# Patient Record
Sex: MALE | Race: WHITE | Employment: FULL TIME | URBAN - METROPOLITAN AREA
[De-identification: names, ages, dates, MRNs, and addresses within clinical notes are randomized per-mention and may not be internally consistent; named-entity substitution may affect disease eponyms.]

---

## 2024-08-14 ENCOUNTER — APPOINTMENT (OUTPATIENT)
Dept: CARDIOLOGY | Facility: CLINIC | Age: 69
End: 2024-08-14
Payer: COMMERCIAL

## 2024-10-14 ENCOUNTER — HOSPITAL ENCOUNTER (INPATIENT)
Facility: HOSPITAL | Age: 69
LOS: 4 days | Discharge: HOME | End: 2024-10-18
Payer: MEDICARE

## 2024-10-14 ENCOUNTER — APPOINTMENT (OUTPATIENT)
Dept: RADIOLOGY | Facility: HOSPITAL | Age: 69
End: 2024-10-14

## 2024-10-14 ENCOUNTER — APPOINTMENT (OUTPATIENT)
Dept: CARDIOLOGY | Facility: HOSPITAL | Age: 69
End: 2024-10-14

## 2024-10-14 DIAGNOSIS — I31.39 PERICARDIAL EFFUSION (HHS-HCC): Primary | ICD-10-CM

## 2024-10-14 LAB
ABO GROUP (TYPE) IN BLOOD: NORMAL
ALBUMIN SERPL BCP-MCNC: 3.6 G/DL (ref 3.4–5)
ALP SERPL-CCNC: 108 U/L (ref 33–136)
ALT SERPL W P-5'-P-CCNC: 21 U/L (ref 10–52)
ANION GAP SERPL CALC-SCNC: 18 MMOL/L (ref 10–20)
ANTIBODY SCREEN: NORMAL
APTT PPP: 27 SECONDS (ref 27–38)
AST SERPL W P-5'-P-CCNC: 18 U/L (ref 9–39)
BASOPHILS # BLD AUTO: 0.02 X10*3/UL (ref 0–0.1)
BASOPHILS NFR BLD AUTO: 0.3 %
BILIRUB SERPL-MCNC: 1 MG/DL (ref 0–1.2)
BNP SERPL-MCNC: 108 PG/ML (ref 0–99)
BUN SERPL-MCNC: 23 MG/DL (ref 6–23)
CALCIUM SERPL-MCNC: 8.6 MG/DL (ref 8.6–10.6)
CARDIAC TROPONIN I PNL SERPL HS: 25 NG/L (ref 0–53)
CHLORIDE SERPL-SCNC: 99 MMOL/L (ref 98–107)
CO2 SERPL-SCNC: 27 MMOL/L (ref 21–32)
CREAT SERPL-MCNC: 1 MG/DL (ref 0.5–1.3)
CRP SERPL-MCNC: 17.08 MG/DL
EGFRCR SERPLBLD CKD-EPI 2021: 82 ML/MIN/1.73M*2
EOSINOPHIL # BLD AUTO: 0.19 X10*3/UL (ref 0–0.7)
EOSINOPHIL NFR BLD AUTO: 2.5 %
ERYTHROCYTE [DISTWIDTH] IN BLOOD BY AUTOMATED COUNT: 17.4 % (ref 11.5–14.5)
GLUCOSE BLD MANUAL STRIP-MCNC: 141 MG/DL (ref 74–99)
GLUCOSE BLD MANUAL STRIP-MCNC: 229 MG/DL (ref 74–99)
GLUCOSE SERPL-MCNC: 119 MG/DL (ref 74–99)
HCT VFR BLD AUTO: 45.8 % (ref 41–52)
HGB BLD-MCNC: 14.2 G/DL (ref 13.5–17.5)
IMM GRANULOCYTES # BLD AUTO: 0.02 X10*3/UL (ref 0–0.7)
IMM GRANULOCYTES NFR BLD AUTO: 0.3 % (ref 0–0.9)
INR PPP: 1.2 (ref 0.9–1.1)
LACTATE SERPL-SCNC: 2.3 MMOL/L (ref 0.4–2)
LACTATE SERPL-SCNC: 3.9 MMOL/L (ref 0.4–2)
LYMPHOCYTES # BLD AUTO: 1.53 X10*3/UL (ref 1.2–4.8)
LYMPHOCYTES NFR BLD AUTO: 20.2 %
MAGNESIUM SERPL-MCNC: 2.13 MG/DL (ref 1.6–2.4)
MCH RBC QN AUTO: 26.9 PG (ref 26–34)
MCHC RBC AUTO-ENTMCNC: 31 G/DL (ref 32–36)
MCV RBC AUTO: 87 FL (ref 80–100)
MONOCYTES # BLD AUTO: 0.59 X10*3/UL (ref 0.1–1)
MONOCYTES NFR BLD AUTO: 7.8 %
NEUTROPHILS # BLD AUTO: 5.22 X10*3/UL (ref 1.2–7.7)
NEUTROPHILS NFR BLD AUTO: 68.9 %
NRBC BLD-RTO: 0 /100 WBCS (ref 0–0)
PHOSPHATE SERPL-MCNC: 4.4 MG/DL (ref 2.5–4.9)
PLATELET # BLD AUTO: 133 X10*3/UL (ref 150–450)
POTASSIUM SERPL-SCNC: 3.5 MMOL/L (ref 3.5–5.3)
PROT SERPL-MCNC: 6.5 G/DL (ref 6.4–8.2)
PROTHROMBIN TIME: 13.6 SECONDS (ref 9.8–12.8)
RBC # BLD AUTO: 5.28 X10*6/UL (ref 4.5–5.9)
RH FACTOR (ANTIGEN D): NORMAL
SODIUM SERPL-SCNC: 140 MMOL/L (ref 136–145)
WBC # BLD AUTO: 7.6 X10*3/UL (ref 4.4–11.3)

## 2024-10-14 PROCEDURE — 83735 ASSAY OF MAGNESIUM: CPT

## 2024-10-14 PROCEDURE — 2500000001 HC RX 250 WO HCPCS SELF ADMINISTERED DRUGS (ALT 637 FOR MEDICARE OP)

## 2024-10-14 PROCEDURE — 1200000002 HC GENERAL ROOM WITH TELEMETRY DAILY

## 2024-10-14 PROCEDURE — 82947 ASSAY GLUCOSE BLOOD QUANT: CPT

## 2024-10-14 PROCEDURE — 71045 X-RAY EXAM CHEST 1 VIEW: CPT

## 2024-10-14 PROCEDURE — 2500000002 HC RX 250 W HCPCS SELF ADMINISTERED DRUGS (ALT 637 FOR MEDICARE OP, ALT 636 FOR OP/ED)

## 2024-10-14 PROCEDURE — 36415 COLL VENOUS BLD VENIPUNCTURE: CPT

## 2024-10-14 PROCEDURE — 85610 PROTHROMBIN TIME: CPT

## 2024-10-14 PROCEDURE — 86140 C-REACTIVE PROTEIN: CPT

## 2024-10-14 PROCEDURE — 83605 ASSAY OF LACTIC ACID: CPT

## 2024-10-14 PROCEDURE — 93005 ELECTROCARDIOGRAM TRACING: CPT

## 2024-10-14 PROCEDURE — 80053 COMPREHEN METABOLIC PANEL: CPT

## 2024-10-14 PROCEDURE — 93010 ELECTROCARDIOGRAM REPORT: CPT | Performed by: INTERNAL MEDICINE

## 2024-10-14 PROCEDURE — 86901 BLOOD TYPING SEROLOGIC RH(D): CPT

## 2024-10-14 PROCEDURE — 84484 ASSAY OF TROPONIN QUANT: CPT

## 2024-10-14 PROCEDURE — 84100 ASSAY OF PHOSPHORUS: CPT

## 2024-10-14 PROCEDURE — 85025 COMPLETE CBC W/AUTO DIFF WBC: CPT

## 2024-10-14 PROCEDURE — 99222 1ST HOSP IP/OBS MODERATE 55: CPT

## 2024-10-14 PROCEDURE — 83880 ASSAY OF NATRIURETIC PEPTIDE: CPT

## 2024-10-14 PROCEDURE — 71045 X-RAY EXAM CHEST 1 VIEW: CPT | Performed by: STUDENT IN AN ORGANIZED HEALTH CARE EDUCATION/TRAINING PROGRAM

## 2024-10-14 RX ORDER — ROSUVASTATIN CALCIUM 40 MG/1
40 TABLET, COATED ORAL NIGHTLY
Status: DISCONTINUED | OUTPATIENT
Start: 2024-10-14 | End: 2024-10-18 | Stop reason: HOSPADM

## 2024-10-14 RX ORDER — COLCHICINE 0.6 MG/1
0.6 TABLET ORAL DAILY
Status: DISCONTINUED | OUTPATIENT
Start: 2024-10-15 | End: 2024-10-15

## 2024-10-14 RX ORDER — ACETAMINOPHEN 325 MG/1
650 TABLET ORAL EVERY 6 HOURS PRN
Status: DISCONTINUED | OUTPATIENT
Start: 2024-10-14 | End: 2024-10-18 | Stop reason: HOSPADM

## 2024-10-14 RX ORDER — PRAMIPEXOLE DIHYDROCHLORIDE 0.5 MG/1
0.5 TABLET ORAL EVERY MORNING
Status: DISCONTINUED | OUTPATIENT
Start: 2024-10-15 | End: 2024-10-14

## 2024-10-14 RX ORDER — POTASSIUM CHLORIDE 20 MEQ/1
40 TABLET, EXTENDED RELEASE ORAL ONCE
Status: COMPLETED | OUTPATIENT
Start: 2024-10-14 | End: 2024-10-14

## 2024-10-14 RX ORDER — EZETIMIBE 10 MG/1
10 TABLET ORAL NIGHTLY
Status: DISCONTINUED | OUTPATIENT
Start: 2024-10-14 | End: 2024-10-18 | Stop reason: HOSPADM

## 2024-10-14 RX ORDER — PANTOPRAZOLE SODIUM 20 MG/1
20 TABLET, DELAYED RELEASE ORAL
Status: DISCONTINUED | OUTPATIENT
Start: 2024-10-15 | End: 2024-10-18 | Stop reason: HOSPADM

## 2024-10-14 RX ORDER — PREGABALIN 75 MG/1
150 CAPSULE ORAL 2 TIMES DAILY
Status: DISCONTINUED | OUTPATIENT
Start: 2024-10-14 | End: 2024-10-18 | Stop reason: HOSPADM

## 2024-10-14 RX ORDER — INSULIN LISPRO 100 [IU]/ML
0-5 INJECTION, SOLUTION INTRAVENOUS; SUBCUTANEOUS
Status: DISCONTINUED | OUTPATIENT
Start: 2024-10-15 | End: 2024-10-18 | Stop reason: HOSPADM

## 2024-10-14 RX ORDER — NORTRIPTYLINE HYDROCHLORIDE 25 MG/1
25 CAPSULE ORAL NIGHTLY
Status: DISCONTINUED | OUTPATIENT
Start: 2024-10-14 | End: 2024-10-18 | Stop reason: HOSPADM

## 2024-10-14 RX ORDER — POLYETHYLENE GLYCOL 3350 17 G/17G
17 POWDER, FOR SOLUTION ORAL DAILY
Status: DISCONTINUED | OUTPATIENT
Start: 2024-10-14 | End: 2024-10-18 | Stop reason: HOSPADM

## 2024-10-14 RX ORDER — PRAMIPEXOLE DIHYDROCHLORIDE 0.5 MG/1
0.5 TABLET ORAL NIGHTLY
Status: DISCONTINUED | OUTPATIENT
Start: 2024-10-14 | End: 2024-10-18 | Stop reason: HOSPADM

## 2024-10-14 RX ORDER — ASPIRIN 325 MG
650 TABLET, DELAYED RELEASE (ENTERIC COATED) ORAL EVERY 8 HOURS
Status: DISCONTINUED | OUTPATIENT
Start: 2024-10-14 | End: 2024-10-18 | Stop reason: HOSPADM

## 2024-10-14 RX ORDER — DULOXETIN HYDROCHLORIDE 30 MG/1
30 CAPSULE, DELAYED RELEASE ORAL DAILY
Status: DISCONTINUED | OUTPATIENT
Start: 2024-10-14 | End: 2024-10-18 | Stop reason: HOSPADM

## 2024-10-14 SDOH — SOCIAL STABILITY: SOCIAL INSECURITY: HAS ANYONE EVER THREATENED TO HURT YOUR FAMILY OR YOUR PETS?: NO

## 2024-10-14 SDOH — SOCIAL STABILITY: SOCIAL INSECURITY: HAVE YOU HAD ANY THOUGHTS OF HARMING ANYONE ELSE?: NO

## 2024-10-14 SDOH — SOCIAL STABILITY: SOCIAL INSECURITY: ABUSE: ADULT

## 2024-10-14 SDOH — SOCIAL STABILITY: SOCIAL INSECURITY: ARE YOU OR HAVE YOU BEEN THREATENED OR ABUSED PHYSICALLY, EMOTIONALLY, OR SEXUALLY BY ANYONE?: NO

## 2024-10-14 SDOH — SOCIAL STABILITY: SOCIAL INSECURITY: DO YOU FEEL ANYONE HAS EXPLOITED OR TAKEN ADVANTAGE OF YOU FINANCIALLY OR OF YOUR PERSONAL PROPERTY?: NO

## 2024-10-14 SDOH — SOCIAL STABILITY: SOCIAL INSECURITY: DO YOU FEEL UNSAFE GOING BACK TO THE PLACE WHERE YOU ARE LIVING?: NO

## 2024-10-14 SDOH — SOCIAL STABILITY: SOCIAL INSECURITY: DOES ANYONE TRY TO KEEP YOU FROM HAVING/CONTACTING OTHER FRIENDS OR DOING THINGS OUTSIDE YOUR HOME?: NO

## 2024-10-14 SDOH — SOCIAL STABILITY: SOCIAL INSECURITY: HAVE YOU HAD THOUGHTS OF HARMING ANYONE ELSE?: NO

## 2024-10-14 SDOH — SOCIAL STABILITY: SOCIAL INSECURITY: ARE THERE ANY APPARENT SIGNS OF INJURIES/BEHAVIORS THAT COULD BE RELATED TO ABUSE/NEGLECT?: NO

## 2024-10-14 SDOH — SOCIAL STABILITY: SOCIAL INSECURITY: WERE YOU ABLE TO COMPLETE ALL THE BEHAVIORAL HEALTH SCREENINGS?: YES

## 2024-10-14 ASSESSMENT — COLUMBIA-SUICIDE SEVERITY RATING SCALE - C-SSRS
2. HAVE YOU ACTUALLY HAD ANY THOUGHTS OF KILLING YOURSELF?: NO
6. HAVE YOU EVER DONE ANYTHING, STARTED TO DO ANYTHING, OR PREPARED TO DO ANYTHING TO END YOUR LIFE?: NO
1. IN THE PAST MONTH, HAVE YOU WISHED YOU WERE DEAD OR WISHED YOU COULD GO TO SLEEP AND NOT WAKE UP?: NO

## 2024-10-14 ASSESSMENT — ACTIVITIES OF DAILY LIVING (ADL)
HEARING - RIGHT EAR: FUNCTIONAL
FEEDING YOURSELF: INDEPENDENT
TOILETING: INDEPENDENT
WALKS IN HOME: INDEPENDENT
GROOMING: INDEPENDENT
ADEQUATE_TO_COMPLETE_ADL: YES
HEARING - LEFT EAR: FUNCTIONAL
JUDGMENT_ADEQUATE_SAFELY_COMPLETE_DAILY_ACTIVITIES: YES
BATHING: INDEPENDENT
PATIENT'S MEMORY ADEQUATE TO SAFELY COMPLETE DAILY ACTIVITIES?: YES
DRESSING YOURSELF: INDEPENDENT

## 2024-10-14 ASSESSMENT — COGNITIVE AND FUNCTIONAL STATUS - GENERAL
MOVING TO AND FROM BED TO CHAIR: A LITTLE
HELP NEEDED FOR BATHING: A LITTLE
WALKING IN HOSPITAL ROOM: A LITTLE
DAILY ACTIVITIY SCORE: 22
CLIMB 3 TO 5 STEPS WITH RAILING: A LITTLE
DRESSING REGULAR LOWER BODY CLOTHING: A LITTLE
MOBILITY SCORE: 20
PATIENT BASELINE BEDBOUND: NO
STANDING UP FROM CHAIR USING ARMS: A LITTLE

## 2024-10-14 ASSESSMENT — LIFESTYLE VARIABLES
AUDIT-C TOTAL SCORE: 2
HOW OFTEN DO YOU HAVE 6 OR MORE DRINKS ON ONE OCCASION: NEVER
SKIP TO QUESTIONS 9-10: 0
HOW OFTEN DO YOU HAVE A DRINK CONTAINING ALCOHOL: MONTHLY OR LESS
AUDIT-C TOTAL SCORE: 2
HOW MANY STANDARD DRINKS CONTAINING ALCOHOL DO YOU HAVE ON A TYPICAL DAY: 3 OR 4

## 2024-10-14 ASSESSMENT — PATIENT HEALTH QUESTIONNAIRE - PHQ9
SUM OF ALL RESPONSES TO PHQ9 QUESTIONS 1 & 2: 0
2. FEELING DOWN, DEPRESSED OR HOPELESS: NOT AT ALL
1. LITTLE INTEREST OR PLEASURE IN DOING THINGS: NOT AT ALL

## 2024-10-14 ASSESSMENT — PAIN - FUNCTIONAL ASSESSMENT: PAIN_FUNCTIONAL_ASSESSMENT: 0-10

## 2024-10-14 ASSESSMENT — PAIN SCALES - GENERAL
PAINLEVEL_OUTOF10: 0 - NO PAIN
PAINLEVEL_OUTOF10: 0 - NO PAIN

## 2024-10-14 NOTE — H&P
History Of Present Illness  Campos Mendoza is a 69 y.o. male presenting as a transfer from [facility] for large pericardial effusion.     Patient reports usual state of health until Friday 10/11 night. Patient has trouble sleeping due to stiff neck and pain in L shoulder which then spread to R shoulder. On Saturday the pain continued and 1300mg tylenol did not help. Pain is worse with lying down. On Sunday he became dizzy especially with exertion. He also became short of breath. He thought he had pneumonia and reports some phlegm production. He says about 3 weeks ago he had COVID with positive test per wife.  Since then denies any new fevers, chills. Had a BM today and is urinating normally. No abdominal pain, n/v/d.      Past Medical History  No past medical history on file.  Patient reports  T2DM  S/p AVR 2020  HFpEF per medication list, prior echo  A flutter May 2024 s/p ablation June 2024 successful not on any blood thinner  Neuropathic pain   Possible COPD  No hx of kidney disease  Hernia of the central abdomen, along incisional line from previous surgery    Surgical History  No past surgical history on file.  Patient reports  -C354 Fusion over ten years ago  -Partial colectomy s/p colitis about 10 years ago. Did not need colectomy  -Aortic Valve Replacement (tissue valve) 2020 for likely regurg per patient description, no hx of endocarditis     Social History  He reports that he quit smoking about 12 years ago. His smoking use included cigarettes. He has never used smokeless tobacco. No history on file for alcohol use and drug use.   Seldom alcohol use  55 year pack history quit in 2012  No drugs    Family History  No family history on file.  No auto immune diseases to patients knowledge     Allergies  Patient has no known allergies.    Review of Systems     Physical Exam  Constitutional:       General: He is not in acute distress.     Appearance: He is not ill-appearing or toxic-appearing.   HENT:       Mouth/Throat:      Mouth: Mucous membranes are moist.   Eyes:      Extraocular Movements: Extraocular movements intact.      Conjunctiva/sclera: Conjunctivae normal.      Pupils: Pupils are equal, round, and reactive to light.   Cardiovascular:      Rate and Rhythm: Normal rate and regular rhythm.      Heart sounds: No murmur heard.     Friction rub present.      Comments: Heart sounds distant. No apparent murmur, possible friction rub.  Pulmonary:      Effort: Pulmonary effort is normal.      Breath sounds: Rales present.      Comments: Mild rales on R base otherwise clear  Abdominal:      Palpations: Abdomen is soft.      Tenderness: There is no abdominal tenderness. There is no guarding or rebound.      Comments: Obese firm abdomen.    Musculoskeletal:         General: Swelling present. Normal range of motion.      Cervical back: Normal range of motion.      Right lower leg: Edema present.      Left lower leg: Edema present.      Comments: 1-2+ pretibial edema    Skin:     General: Skin is warm and dry.      Comments: Patients hands are cooler to touch than forearms, unusual for him.   Neurological:      General: No focal deficit present.      Mental Status: He is alert. Mental status is at baseline.   Psychiatric:         Mood and Affect: Mood normal.         Behavior: Behavior normal.         Thought Content: Thought content normal.         Judgment: Judgment normal.          Last Recorded Vitals  Blood pressure 126/80, pulse 88, temperature 36.5 °C (97.7 °F), resp. rate 20, height 1.829 m (6'), weight 143 kg (316 lb 5.8 oz), SpO2 93%.    Relevant Results  No results found for this or any previous visit (from the past 96 hour(s)).  Pending labs include BNP, CBC, CMP, trop, T&S, coags, lactate, CRP, ESR  No OSH lab values available.     Relevant Imaging This Admission  No OSH imaging received    Most previous TTE in system from OSH 08/2024 as follows  PHYSICIAN INTERPRETATION:  Left Ventricle: The left  ventricular systolic function is normal. There are no regional wall motion abnormalities. The left ventricular cavity size is normal. There is moderate concentric left ventricular hypertrophy. Spectral Doppler shows an impaired relaxation pattern of left ventricular diastolic filling.  Left Atrium: The left atrium is upper limits of normal in size.  Right Ventricle: The right ventricle is slightly enlarged. There is normal right ventricular global systolic function.  Right Atrium: The right atrium is normal in size.  Aortic Valve: There is a prosthetic aortic valve present. There is a [employer] porcine aortic valve bioprosthesis, with a 25 mm reported size. Echo findings are consistent with normal aortic valve prosthesis structure and function. There is no evidence of aortic valve regurgitation. The peak instantaneous gradient of the aortic valve is 26.6 mmHg. The mean gradient of the aortic valve is 14.9 mmHg.  Mitral Valve: The mitral valve is moderately thickened. There is trace mitral valve regurgitation.  Tricuspid Valve: The tricuspid valve is structurally normal. There is mild tricuspid regurgitation.  Pulmonic Valve: The pulmonic valve is structurally normal. There is physiologic pulmonic valve regurgitation.  Pericardium: There is no pericardial effusion noted.  Aorta: The aortic root is normal.  In comparison to the previous echocardiogram(s): Compared with study from 8/11/2022, no significant change.    Assessment and Plan    Mr. Mendoza is a 69 gentleman with PMHx of T2DM, HTN, HFpEF, A flutter s/p June 2024 successful ablation not on AC, possible COPD (55 pack years quit 2012) who was transferred from [facility] for pericardial effusion. They did CT and ECHO imaging but none were received. Bedside ECHO on arrival confirms effusion without concern for tamponade at this time. History suggests high likelihood of pericarditis as etiology. Patient will receive further work up tomorrow pending ECHO with  "day teams and labs follow up. Will need to consider need for drainage, closely following vitals at this time.    #Pericardial Effusion likely dt pericarditis  Patient with recent viral illness presents with positional chest pain likely consistent with pericarditis. Bedside ECHO by Dr. Cedillo demonstrated effusion with IVC compression pattern with low concern for tamponade. Presenting EKG wnl, trops taken will follow.  - Telemetry  - I&O  - q4 Vitals  - TTE tomorrow morning  - Follow up CXR   - Follow up labs as above  - If GFR >30 will start 0.6 colchicine this PM pending labs  - Start ASA 650mg q8     #HFpEF  #HTN  #s/p AVR 2020 likely dt regurg   - HOLD GDMT and antihypertensives as blow  ----Metop  ----Jardiance  ----Valsartan  ----Amlodipine  - HOLD daily lasix    #Chronic issues  GERD  - Continue home protonix  HLD  - Continue home Crestor   - Continue home Zetia  T2DM  - Prev on ozempic but stopped due to cost  - Start SSI here  Neuropathy  - Continue home cymbalta, nortriptoline, pregabalin, pramipexole   #Hx A Flutter s/p ablation June 2025  - No AC or rhythm control meds at home    F: PO  E: Replete as needed  N: Cardiac, NPO at midnight  VTE ppx: SCDs, hold lovenox in case of procedure   GI: On protonix  Access: PIV  Code status: FULL CODE confirmed on admission. Patient said verbatim \"Only if I won't be a vegetable\". Wife Karine is NOK and patient confirmed preferred decision maker .    Anastasiya Moon MD PGY1    I saw and evaluated the patient. I personally obtained the key and critical portions of the history and physical exam or was physically present for key and critical portions performed by the resident/fellow. I reviewed the resident/fellow's documentation and discussed the patient with the resident/fellow. I agree with the resident/fellow's medical decision making as documented in the note.    Lg Cedillo MD      "

## 2024-10-15 ENCOUNTER — APPOINTMENT (OUTPATIENT)
Dept: CARDIOLOGY | Facility: HOSPITAL | Age: 69
End: 2024-10-15
Payer: MEDICARE

## 2024-10-15 ENCOUNTER — APPOINTMENT (OUTPATIENT)
Dept: CARDIOLOGY | Facility: HOSPITAL | Age: 69
End: 2024-10-15

## 2024-10-15 LAB
ALBUMIN SERPL BCP-MCNC: 3.6 G/DL (ref 3.4–5)
ANA SER QL HEP2 SUBST: NEGATIVE
ANION GAP SERPL CALC-SCNC: 15 MMOL/L (ref 10–20)
AORTIC VALVE MEAN GRADIENT: 13 MMHG
AORTIC VALVE PEAK VELOCITY: 2.52 M/S
AV PEAK GRADIENT: 25.4 MMHG
AVA (PEAK VEL): 1.74 CM2
AVA (VTI): 1.8 CM2
BUN SERPL-MCNC: 22 MG/DL (ref 6–23)
CALCIUM SERPL-MCNC: 8.2 MG/DL (ref 8.6–10.6)
CARDIAC TROPONIN I PNL SERPL HS: 15 NG/L (ref 0–53)
CHLORIDE SERPL-SCNC: 104 MMOL/L (ref 98–107)
CHOLEST SERPL-MCNC: 123 MG/DL (ref 0–199)
CHOLESTEROL/HDL RATIO: 2.9
CO2 SERPL-SCNC: 22 MMOL/L (ref 21–32)
CREAT SERPL-MCNC: 1.07 MG/DL (ref 0.5–1.3)
EGFRCR SERPLBLD CKD-EPI 2021: 76 ML/MIN/1.73M*2
EJECTION FRACTION APICAL 4 CHAMBER: 77.3
EJECTION FRACTION: 68 %
ERYTHROCYTE [DISTWIDTH] IN BLOOD BY AUTOMATED COUNT: 17.3 % (ref 11.5–14.5)
EST. AVERAGE GLUCOSE BLD GHB EST-MCNC: 183 MG/DL
GLUCOSE BLD MANUAL STRIP-MCNC: 103 MG/DL (ref 74–99)
GLUCOSE BLD MANUAL STRIP-MCNC: 138 MG/DL (ref 74–99)
GLUCOSE BLD MANUAL STRIP-MCNC: 143 MG/DL (ref 74–99)
GLUCOSE BLD MANUAL STRIP-MCNC: 156 MG/DL (ref 74–99)
GLUCOSE SERPL-MCNC: 144 MG/DL (ref 74–99)
HBA1C MFR BLD: 8 %
HCT VFR BLD AUTO: 44.1 % (ref 41–52)
HDLC SERPL-MCNC: 42.3 MG/DL
HGB BLD-MCNC: 13.5 G/DL (ref 13.5–17.5)
LDLC SERPL CALC-MCNC: 54 MG/DL
LEFT VENTRICULAR OUTFLOW TRACT DIAMETER: 1.8 CM
MAGNESIUM SERPL-MCNC: 2.33 MG/DL (ref 1.6–2.4)
MCH RBC QN AUTO: 27.3 PG (ref 26–34)
MCHC RBC AUTO-ENTMCNC: 30.6 G/DL (ref 32–36)
MCV RBC AUTO: 89 FL (ref 80–100)
MITRAL VALVE E/A RATIO: 0.78
NON HDL CHOLESTEROL: 81 MG/DL (ref 0–149)
NRBC BLD-RTO: 0 /100 WBCS (ref 0–0)
PHOSPHATE SERPL-MCNC: 4.3 MG/DL (ref 2.5–4.9)
PLATELET # BLD AUTO: 133 X10*3/UL (ref 150–450)
POTASSIUM SERPL-SCNC: 4.2 MMOL/L (ref 3.5–5.3)
RBC # BLD AUTO: 4.94 X10*6/UL (ref 4.5–5.9)
RIGHT VENTRICLE FREE WALL PEAK S': 10.1 CM/S
SODIUM SERPL-SCNC: 137 MMOL/L (ref 136–145)
TRICUSPID ANNULAR PLANE SYSTOLIC EXCURSION: 1.1 CM
TRIGL SERPL-MCNC: 136 MG/DL (ref 0–149)
TSH SERPL-ACNC: 1.74 MIU/L (ref 0.44–3.98)
VLDL: 27 MG/DL (ref 0–40)
WBC # BLD AUTO: 7 X10*3/UL (ref 4.4–11.3)

## 2024-10-15 PROCEDURE — 93306 TTE W/DOPPLER COMPLETE: CPT | Performed by: INTERNAL MEDICINE

## 2024-10-15 PROCEDURE — 83735 ASSAY OF MAGNESIUM: CPT

## 2024-10-15 PROCEDURE — 2500000004 HC RX 250 GENERAL PHARMACY W/ HCPCS (ALT 636 FOR OP/ED)

## 2024-10-15 PROCEDURE — 84443 ASSAY THYROID STIM HORMONE: CPT

## 2024-10-15 PROCEDURE — 99233 SBSQ HOSP IP/OBS HIGH 50: CPT

## 2024-10-15 PROCEDURE — 2500000001 HC RX 250 WO HCPCS SELF ADMINISTERED DRUGS (ALT 637 FOR MEDICARE OP)

## 2024-10-15 PROCEDURE — 84484 ASSAY OF TROPONIN QUANT: CPT

## 2024-10-15 PROCEDURE — 80069 RENAL FUNCTION PANEL: CPT

## 2024-10-15 PROCEDURE — 82947 ASSAY GLUCOSE BLOOD QUANT: CPT

## 2024-10-15 PROCEDURE — 36415 COLL VENOUS BLD VENIPUNCTURE: CPT

## 2024-10-15 PROCEDURE — 1200000002 HC GENERAL ROOM WITH TELEMETRY DAILY

## 2024-10-15 PROCEDURE — 83036 HEMOGLOBIN GLYCOSYLATED A1C: CPT

## 2024-10-15 PROCEDURE — 80061 LIPID PANEL: CPT

## 2024-10-15 PROCEDURE — 2500000002 HC RX 250 W HCPCS SELF ADMINISTERED DRUGS (ALT 637 FOR MEDICARE OP, ALT 636 FOR OP/ED)

## 2024-10-15 PROCEDURE — 86038 ANTINUCLEAR ANTIBODIES: CPT

## 2024-10-15 PROCEDURE — 93306 TTE W/DOPPLER COMPLETE: CPT

## 2024-10-15 PROCEDURE — 85027 COMPLETE CBC AUTOMATED: CPT

## 2024-10-15 RX ORDER — ASPIRIN 81 MG/1
81 TABLET ORAL DAILY
COMMUNITY
End: 2024-10-18 | Stop reason: HOSPADM

## 2024-10-15 RX ORDER — UBIDECARENONE 30 MG
30 CAPSULE ORAL DAILY
COMMUNITY

## 2024-10-15 RX ORDER — VALSARTAN 160 MG/1
160 TABLET ORAL DAILY
COMMUNITY

## 2024-10-15 RX ORDER — EZETIMIBE 10 MG/1
10 TABLET ORAL DAILY
COMMUNITY

## 2024-10-15 RX ORDER — METOPROLOL TARTRATE 25 MG/1
25 TABLET, FILM COATED ORAL 2 TIMES DAILY
COMMUNITY

## 2024-10-15 RX ORDER — BUPROPION HYDROCHLORIDE 150 MG/1
150 TABLET ORAL DAILY
COMMUNITY

## 2024-10-15 RX ORDER — PREGABALIN 150 MG/1
150 CAPSULE ORAL 2 TIMES DAILY
COMMUNITY

## 2024-10-15 RX ORDER — OMEPRAZOLE 20 MG/1
20 CAPSULE, DELAYED RELEASE ORAL DAILY
COMMUNITY

## 2024-10-15 RX ORDER — NORTRIPTYLINE HYDROCHLORIDE 25 MG/1
25 CAPSULE ORAL DAILY
COMMUNITY

## 2024-10-15 RX ORDER — AMLODIPINE BESYLATE 10 MG/1
10 TABLET ORAL DAILY
COMMUNITY

## 2024-10-15 RX ORDER — FUROSEMIDE 40 MG/1
40 TABLET ORAL DAILY
COMMUNITY

## 2024-10-15 RX ORDER — COLCHICINE 0.6 MG/1
0.6 TABLET ORAL ONCE
Status: COMPLETED | OUTPATIENT
Start: 2024-10-15 | End: 2024-10-15

## 2024-10-15 RX ORDER — DULOXETIN HYDROCHLORIDE 30 MG/1
30 CAPSULE, DELAYED RELEASE ORAL DAILY
COMMUNITY

## 2024-10-15 RX ORDER — ROSUVASTATIN CALCIUM 40 MG/1
40 TABLET, COATED ORAL DAILY
COMMUNITY

## 2024-10-15 RX ORDER — COLCHICINE 0.6 MG/1
0.6 TABLET ORAL 2 TIMES DAILY
Status: DISCONTINUED | OUTPATIENT
Start: 2024-10-15 | End: 2024-10-18 | Stop reason: HOSPADM

## 2024-10-15 RX ORDER — PRAMIPEXOLE DIHYDROCHLORIDE 0.5 MG/1
0.5 TABLET ORAL DAILY
COMMUNITY

## 2024-10-15 ASSESSMENT — COGNITIVE AND FUNCTIONAL STATUS - GENERAL
TURNING FROM BACK TO SIDE WHILE IN FLAT BAD: A LITTLE
DRESSING REGULAR LOWER BODY CLOTHING: A LITTLE
TOILETING: A LITTLE
WALKING IN HOSPITAL ROOM: A LITTLE
STANDING UP FROM CHAIR USING ARMS: A LITTLE
DAILY ACTIVITIY SCORE: 18
DAILY ACTIVITIY SCORE: 18
DRESSING REGULAR UPPER BODY CLOTHING: A LITTLE
MOBILITY SCORE: 18
HELP NEEDED FOR BATHING: A LITTLE
HELP NEEDED FOR BATHING: A LITTLE
MOVING TO AND FROM BED TO CHAIR: A LITTLE
TURNING FROM BACK TO SIDE WHILE IN FLAT BAD: A LITTLE
MOBILITY SCORE: 18
DRESSING REGULAR LOWER BODY CLOTHING: A LITTLE
EATING MEALS: A LITTLE
WALKING IN HOSPITAL ROOM: A LITTLE
DRESSING REGULAR UPPER BODY CLOTHING: A LITTLE
EATING MEALS: A LITTLE
PERSONAL GROOMING: A LITTLE
MOVING TO AND FROM BED TO CHAIR: A LITTLE
CLIMB 3 TO 5 STEPS WITH RAILING: A LITTLE
TOILETING: A LITTLE
CLIMB 3 TO 5 STEPS WITH RAILING: A LITTLE
PERSONAL GROOMING: A LITTLE
STANDING UP FROM CHAIR USING ARMS: A LITTLE
MOVING FROM LYING ON BACK TO SITTING ON SIDE OF FLAT BED WITH BEDRAILS: A LITTLE
MOVING FROM LYING ON BACK TO SITTING ON SIDE OF FLAT BED WITH BEDRAILS: A LITTLE

## 2024-10-15 ASSESSMENT — PAIN DESCRIPTION - LOCATION
LOCATION: CHEST
LOCATION: CHEST

## 2024-10-15 ASSESSMENT — PAIN - FUNCTIONAL ASSESSMENT
PAIN_FUNCTIONAL_ASSESSMENT: 0-10
PAIN_FUNCTIONAL_ASSESSMENT: 0-10

## 2024-10-15 ASSESSMENT — PAIN SCALES - GENERAL
PAINLEVEL_OUTOF10: 3
PAINLEVEL_OUTOF10: 1
PAINLEVEL_OUTOF10: 0 - NO PAIN

## 2024-10-15 ASSESSMENT — PAIN DESCRIPTION - ORIENTATION
ORIENTATION: MID
ORIENTATION: UPPER

## 2024-10-15 NOTE — CARE PLAN
Problem: Pain - Adult  Goal: Verbalizes/displays adequate comfort level or baseline comfort level  Outcome: Progressing     Problem: Safety - Adult  Goal: Free from fall injury  Outcome: Progressing     Problem: Discharge Planning  Goal: Discharge to home or other facility with appropriate resources  Outcome: Progressing     Problem: Chronic Conditions and Co-morbidities  Goal: Patient's chronic conditions and co-morbidity symptoms are monitored and maintained or improved  Outcome: Progressing   The patient's goals for the shift include      The clinical goals for the shift include

## 2024-10-15 NOTE — NURSING NOTE
Rapid Response Nurse Note:  [x] RADAR alert:   Score 6    Pager time: 445  Arrival time: 445  Event end time: 447  Location: LT 3091  [x] Phone triage     Rapid response initiated by:  [] Rapid Response RN [] Family [] Nursing Supervisor [] Physician   [x] RADAR auto-page [] Sepsis auto-page [] RN [] RT   [] NP/PA [] Other:     Primary reason for call:   [] BAT [] New CPAP/BiPAP [] Bleeding [] Change in mental status   [] Chest pain [] Code blue [] FiO2 >/= 50% [] HR </= 40 bpm   [] HR >/= 130 bpm [] Hyperglycemia [] Hypoglycemia [x] RADAR    [] RR </= 8 bpm [] RR >/= 30 bpm [] SBP </= 90 mmHg [] SpO2 < 90%   [] Seizure [] Sepsis [] Staff concern:     Initial VS and/or RADAR VS:  radar vitals below in bold    Vitals:    10/14/24 1726 10/14/24 2225 10/15/24 0051 10/15/24 044   BP:  118/73 126/77 109/69   BP Location:  Left arm Left arm Left arm   Patient Position:  Sitting Lying Lying   Pulse:  98 97 91   Resp:  20 16 17   Temp:  37.1 °C (98.8 °F) 37.2 °C (99 °F) 36.1 °C (97 °F)   TempSrc: Temporal Temporal Temporal Temporal   SpO2:  91% 91% 92%   Weight:    143 kg (315 lb 0.6 oz)   Height:            Interventions:  [x] None [] ABG [] Assist w/ICU transfer [] BAT paged    [] Bag mask [] Blood [] Cardioversion [] Code Blue   [] Code blue for intubation [] Code status changed [] Chest x-ray [] EKG   [] IV fluid/bolus [] KUB x-ray [] Labs/cultures [] Medication   [] Nebulizer treatment [] NIPPV (CPAP/BiPAP) [] Oxygen [] Oral airway   [] Peripheral IV [] Palliative care consult [] CT/MRI [] Sepsis protocol    [] Suctioned [] Other:       Outcome:  [] Coded and  [] Code blue for intubation [] Coded and transferred to ICU []  on division   [x] Remained on division (no change) [] Remained on division + additional monitoring [] Remained in ED [] Transferred to ED   [] Transferred to ICU [] Transferred to inpatient status [] Transferred for interventions (procedure) [] Transferred to ICU stepdown    []  Transferred to surgery [] Transferred to telemetry [] Sepsis protocol [] STEMI protocol   [] Stroke protocol [x] Bedside nurse instructed to page rapid response for any concerns or acute change in condition/VS     Additional Comments: No concerns from RN at this time.

## 2024-10-15 NOTE — PROGRESS NOTES
10/15/24 1009   Discharge Planning   Living Arrangements Spouse/significant other   Support Systems Spouse/significant other   Assistance Needed Independent   Type of Residence Private residence   Number of Stairs Within Residence 13   Do you have animals or pets at home? Yes   Type of Animals or Pets Cat   Home or Post Acute Services None   Expected Discharge Disposition Home   Does the patient need discharge transport arranged? No        Previous Home Care: none  DME: cane  Pharmacy: [facility]  Falls: no  PCP: Dr. Leos   Dialysis: no    Met with patient and introduced myself as Care Coordinator and member of the discharge planning team.  Pt was admitted from [facility]. He plans to return home with his wife at time of discharge. He was independent in ADL's prior to hospitalization. No home care needs were identified at this time. Care Coordinator will continue to follow for home going needs.

## 2024-10-15 NOTE — NURSING NOTE
Rapid Response Nurse Note:  [x] RADAR alert:   Score 6    Pager time:   Arrival time:   Event end time:   Location: [facility] 3091  [] Phone triage     Rapid response initiated by:  [] Rapid Response RN [] Family [] Nursing Supervisor [] Physician   [x] RADAR auto-page [] Sepsis auto-page [] RN [] RT   [] NP/PA [] Other:     Primary reason for call:   [] BAT [] New CPAP/BiPAP [] Bleeding [] Change in mental status   [] Chest pain [] Code blue [] FiO2 >/= 50% [] HR </= 40 bpm   [] HR >/= 130 bpm [] Hyperglycemia [] Hypoglycemia [x] RADAR    [] RR </= 8 bpm [] RR >/= 30 bpm [] SBP </= 90 mmHg [] SpO2 < 90%   [] Seizure [] Sepsis [] Staff concern:     Initial VS and/or RADAR VS:  radar vitals below in bold    Vitals:    10/14/24 1722 10/14/24 1725 10/14/24 1726 10/14/24 2225   BP: 126/80   118/73   BP Location:    Left arm   Patient Position:    Sitting   Pulse: 88   98   Resp: 20   20   Temp: 36.5 °C (97.7 °F)   37.1 °C (98.8 °F)   TempSrc:   Temporal Temporal   SpO2: 93%   91%   Weight: 143 kg (316 lb 5.8 oz)      Height:  1.829 m (6')          Interventions:  [x] None [] ABG [] Assist w/ICU transfer [] BAT paged    [] Bag mask [] Blood [] Cardioversion [] Code Blue   [] Code blue for intubation [] Code status changed [] Chest x-ray [] EKG   [] IV fluid/bolus [] KUB x-ray [] Labs/cultures [] Medication   [] Nebulizer treatment [] NIPPV (CPAP/BiPAP) [] Oxygen [] Oral airway   [] Peripheral IV [] Palliative care consult [] CT/MRI [] Sepsis protocol    [] Suctioned [] Other:       Outcome:  [] Coded and  [] Code blue for intubation [] Coded and transferred to ICU []  on division   [x] Remained on division (no change) [] Remained on division + additional monitoring [] Remained in ED [] Transferred to ED   [] Transferred to ICU [] Transferred to inpatient status [] Transferred for interventions (procedure) [] Transferred to ICU stepdown    [] Transferred to surgery [] Transferred to telemetry [] Sepsis  protocol [] STEMI protocol   [] Stroke protocol [x] Bedside nurse instructed to page rapid response for any concerns or acute change in condition/VS     Additional Comments: No concerns at this time from RN.

## 2024-10-15 NOTE — PROGRESS NOTES
Pharmacy Medication History Review    Campos Mendoza is a 69 y.o. male admitted for Pericardial effusion ([facility]). Pharmacy reviewed the patient's smhxc-ko-rrenjycum medications and allergies for accuracy.    Medications ADDED:  All medications   Medications CHANGED:  None  Medications REMOVED:   None    The list below reflects the updated PTA list.   Prior to Admission Medications   Prescriptions Last Dose Informant   DULoxetine (Cymbalta) 30 mg DR capsule  Spouse/Significant Other   Sig: Take 1 capsule (30 mg) by mouth once daily. Do not crush or chew.   amLODIPine (Norvasc) 10 mg tablet  Spouse/Significant Other   Sig: Take 1 tablet (10 mg) by mouth once daily.   aspirin 81 mg EC tablet  Spouse/Significant Other   Sig: Take 1 tablet (81 mg) by mouth once daily.   buPROPion XL (Wellbutrin XL) 150 mg 24 hr tablet  Spouse/Significant Other   Sig: Take 1 tablet (150 mg) by mouth once daily. Do not crush, chew, or split.   co-enzyme Q-10 30 mg capsule  Spouse/Significant Other   Sig: Take 1 capsule (30 mg) by mouth once daily.   empagliflozin (Jardiance) 25 mg  Spouse/Significant Other   Sig: Take 1 tablet (25 mg) by mouth once daily.   ezetimibe (Zetia) 10 mg tablet  Spouse/Significant Other   Sig: Take 1 tablet (10 mg) by mouth once daily.   furosemide (Lasix) 40 mg tablet  Spouse/Significant Other   Sig: Take 1 tablet (40 mg) by mouth once daily.   metoprolol tartrate (Lopressor) 25 mg tablet  Spouse/Significant Other   Sig: Take 1 tablet (25 mg) by mouth 2 times a day.   nortriptyline (Pamelor) 25 mg capsule  Spouse/Significant Other   Sig: Take 1 capsule (25 mg) by mouth once daily.   omeprazole (PriLOSEC) 20 mg DR capsule  Spouse/Significant Other   Sig: Take 1 capsule (20 mg) by mouth once daily. Do not crush or chew.   pramipexole (Mirapex) 0.5 mg tablet  Spouse/Significant Other   Sig: Take 1 tablet (0.5 mg) by mouth once daily.   pregabalin (Lyrica) 150 mg capsule  Spouse/Significant Other   Sig: Take 1  "capsule (150 mg) by mouth 2 times a day.   rosuvastatin (Crestor) 40 mg tablet  Spouse/Significant Other   Sig: Take 1 tablet (40 mg) by mouth once daily.   valsartan (Diovan) 160 mg tablet  Spouse/Significant Other   Sig: Take 1 tablet (160 mg) by mouth once daily.      Facility-Administered Medications: None        The list below reflects the updated allergy list. Please review each documented allergy for additional clarification and justification.  Allergies  Reviewed by Blanca Parisi on 10/15/2024   No Known Allergies         Patient accepts M2B at discharge. Patient's wife states they would like M2B for any new medications prescribed.     Sources:   Patient's wife  Dispense history  O[facility]RS    Additional Comments:  Patient is a poor historian. Patient stated to call wife for medication list as he did not know which medications he was taking.  Patient's wife is over his medications and she was able to recall all the medications the patient is taking.       BLANCA PARISI  PGY-1 Pharmacy Resident  10/15/24     Secure Chat preferred   If no response call [phone] or Vocera \"Med Rec\"     "

## 2024-10-15 NOTE — PROGRESS NOTES
Campos Mendoza is a 69 y.o. male on day 1 of admission presenting with Pericardial effusion ([facility]).      Subjective   NAEON. Patient sleeping comfortably and wakes easily to verbal stimuli. Patient endorses feeling the same as yesterday with no change in his breathing or chest pain. He denies palpitations, n/v/d, abdominal or leg pain. He does not have new swelling.     Objective     Last Recorded Vitals  /78   Pulse 93   Temp 36.2 °C (97.2 °F) (Temporal)   Resp 17   Wt 143 kg (315 lb 0.6 oz)   SpO2 95%   Intake/Output last 3 Shifts:    Intake/Output Summary (Last 24 hours) at 10/15/2024 0903  Last data filed at 10/15/2024 0520  Gross per 24 hour   Intake 120 ml   Output 1200 ml   Net -1080 ml       Admission Weight  Weight: 143 kg (316 lb 5.8 oz) (10/14/24 1722)    Daily Weight  10/15/24 : 143 kg (315 lb 0.6 oz)      Physical Exam  Constitutional:       General: He is not in acute distress.     Appearance: He is not ill-appearing.   Cardiovascular:      Rate and Rhythm: Normal rate and regular rhythm.      Heart sounds: No murmur heard.     Friction rub present.      Comments: Possible friction rub  Pulmonary:      Effort: Pulmonary effort is normal.      Breath sounds: Normal breath sounds.      Comments: On 4L nursing to wean today  Abdominal:      General: Bowel sounds are normal.      Palpations: There is no mass.      Tenderness: There is no abdominal tenderness. There is no guarding or rebound.      Comments: Firm obese abdomen.   Musculoskeletal:      Right lower leg: Edema present.      Left lower leg: Edema present.      Comments: Bilateral pretibial edema 1-2+. Chronic venous status skin changes   Skin:     Comments: Diffuse skin tags   Neurological:      General: No focal deficit present.      Mental Status: He is alert.      Comments: Spontaneous to conversation   Psychiatric:         Mood and Affect: Mood normal.         Behavior: Behavior normal.         Thought Content: Thought  content normal.         Judgment: Judgment normal.       Recent Labs Last 96 hours  Results for orders placed or performed during the hospital encounter of 10/14/24 (from the past 96 hour(s))   Type and screen   Result Value Ref Range    ABO TYPE B     Rh TYPE NEG     ANTIBODY SCREEN NEG    CBC and Auto Differential   Result Value Ref Range    WBC 7.6 4.4 - 11.3 x10*3/uL    nRBC 0.0 0.0 - 0.0 /100 WBCs    RBC 5.28 4.50 - 5.90 x10*6/uL    Hemoglobin 14.2 13.5 - 17.5 g/dL    Hematocrit 45.8 41.0 - 52.0 %    MCV 87 80 - 100 fL    MCH 26.9 26.0 - 34.0 pg    MCHC 31.0 (L) 32.0 - 36.0 g/dL    RDW 17.4 (H) 11.5 - 14.5 %    Platelets 133 (L) 150 - 450 x10*3/uL    Neutrophils % 68.9 40.0 - 80.0 %    Immature Granulocytes %, Automated 0.3 0.0 - 0.9 %    Lymphocytes % 20.2 13.0 - 44.0 %    Monocytes % 7.8 2.0 - 10.0 %    Eosinophils % 2.5 0.0 - 6.0 %    Basophils % 0.3 0.0 - 2.0 %    Neutrophils Absolute 5.22 1.20 - 7.70 x10*3/uL    Immature Granulocytes Absolute, Automated 0.02 0.00 - 0.70 x10*3/uL    Lymphocytes Absolute 1.53 1.20 - 4.80 x10*3/uL    Monocytes Absolute 0.59 0.10 - 1.00 x10*3/uL    Eosinophils Absolute 0.19 0.00 - 0.70 x10*3/uL    Basophils Absolute 0.02 0.00 - 0.10 x10*3/uL   Lactate   Result Value Ref Range    Lactate 3.9 (H) 0.4 - 2.0 mmol/L   B-type natriuretic peptide   Result Value Ref Range     (H) 0 - 99 pg/mL   Comprehensive metabolic panel   Result Value Ref Range    Glucose 119 (H) 74 - 99 mg/dL    Sodium 140 136 - 145 mmol/L    Potassium 3.5 3.5 - 5.3 mmol/L    Chloride 99 98 - 107 mmol/L    Bicarbonate 27 21 - 32 mmol/L    Anion Gap 18 10 - 20 mmol/L    Urea Nitrogen 23 6 - 23 mg/dL    Creatinine 1.00 0.50 - 1.30 mg/dL    eGFR 82 >60 mL/min/1.73m*2    Calcium 8.6 8.6 - 10.6 mg/dL    Albumin 3.6 3.4 - 5.0 g/dL    Alkaline Phosphatase 108 33 - 136 U/L    Total Protein 6.5 6.4 - 8.2 g/dL    AST 18 9 - 39 U/L    Bilirubin, Total 1.0 0.0 - 1.2 mg/dL    ALT 21 10 - 52 U/L   Magnesium   Result  Value Ref Range    Magnesium 2.13 1.60 - 2.40 mg/dL   Phosphorus   Result Value Ref Range    Phosphorus 4.4 2.5 - 4.9 mg/dL   Troponin I, High Sensitivity   Result Value Ref Range    Troponin I, High Sensitivity (CMC) 25 0 - 53 ng/L   C-reactive protein   Result Value Ref Range    C-Reactive Protein 17.08 (H) <1.00 mg/dL   POCT GLUCOSE   Result Value Ref Range    POCT Glucose 141 (H) 74 - 99 mg/dL   Coagulation Screen   Result Value Ref Range    Protime 13.6 (H) 9.8 - 12.8 seconds    INR 1.2 (H) 0.9 - 1.1    aPTT 27 27 - 38 seconds   Lactate   Result Value Ref Range    Lactate 2.3 (H) 0.4 - 2.0 mmol/L   POCT GLUCOSE   Result Value Ref Range    POCT Glucose 229 (H) 74 - 99 mg/dL   CBC   Result Value Ref Range    WBC 7.0 4.4 - 11.3 x10*3/uL    nRBC 0.0 0.0 - 0.0 /100 WBCs    RBC 4.94 4.50 - 5.90 x10*6/uL    Hemoglobin 13.5 13.5 - 17.5 g/dL    Hematocrit 44.1 41.0 - 52.0 %    MCV 89 80 - 100 fL    MCH 27.3 26.0 - 34.0 pg    MCHC 30.6 (L) 32.0 - 36.0 g/dL    RDW 17.3 (H) 11.5 - 14.5 %    Platelets 133 (L) 150 - 450 x10*3/uL   Renal Function Panel   Result Value Ref Range    Glucose 144 (H) 74 - 99 mg/dL    Sodium 137 136 - 145 mmol/L    Potassium 4.2 3.5 - 5.3 mmol/L    Chloride 104 98 - 107 mmol/L    Bicarbonate 22 21 - 32 mmol/L    Anion Gap 15 10 - 20 mmol/L    Urea Nitrogen 22 6 - 23 mg/dL    Creatinine 1.07 0.50 - 1.30 mg/dL    eGFR 76 >60 mL/min/1.73m*2    Calcium 8.2 (L) 8.6 - 10.6 mg/dL    Phosphorus 4.3 2.5 - 4.9 mg/dL    Albumin 3.6 3.4 - 5.0 g/dL   Magnesium   Result Value Ref Range    Magnesium 2.33 1.60 - 2.40 mg/dL   Hemoglobin A1c   Result Value Ref Range    Hemoglobin A1C 8.0 (H) See comment %    Estimated Average Glucose 183 Not Established mg/dL   TSH   Result Value Ref Range    Thyroid Stimulating Hormone 1.74 0.44 - 3.98 mIU/L   Lipid Panel   Result Value Ref Range    Cholesterol 123 0 - 199 mg/dL    HDL-Cholesterol 42.3 mg/dL    Cholesterol/HDL Ratio 2.9     LDL Calculated 54 <=99 mg/dL    VLDL 27 0  - 40 mg/dL    Triglycerides 136 0 - 149 mg/dL    Non HDL Cholesterol 81 0 - 149 mg/dL   POCT GLUCOSE   Result Value Ref Range    POCT Glucose 156 (H) 74 - 99 mg/dL         Relevant Imaging     TTE ordered 10/15    Patient reports CT Chest ad TTE at OSH, no images provided on transfer    XR chest 1 view 10/14/2024  1.  Small right-sided pleural effusion and bibasilar atelectasis. 2. Enlarged cardiopericardial silhouette suggestive of pericardial effusion versus cardiomegaly.      Last TTE from OSH 08/2024 prior to onset of symptoms  PHYSICIAN INTERPRETATION:  Left Ventricle: The left ventricular systolic function is normal. There are no regional wall motion abnormalities. The left ventricular cavity size is normal. There is moderate concentric left ventricular hypertrophy. Spectral Doppler shows an impaired relaxation pattern of left ventricular diastolic filling.  Left Atrium: The left atrium is upper limits of normal in size.  Right Ventricle: The right ventricle is slightly enlarged. There is normal right ventricular global systolic function.  Right Atrium: The right atrium is normal in size.  Aortic Valve: There is a prosthetic aortic valve present. There is a [employer] porcine aortic valve bioprosthesis, with a 25 mm reported size. Echo findings are consistent with normal aortic valve prosthesis structure and function. There is no evidence of aortic valve regurgitation. The peak instantaneous gradient of the aortic valve is 26.6 mmHg. The mean gradient of the aortic valve is 14.9 mmHg.  Mitral Valve: The mitral valve is moderately thickened. There is trace mitral valve regurgitation.  Tricuspid Valve: The tricuspid valve is structurally normal. There is mild tricuspid regurgitation.  Pulmonic Valve: The pulmonic valve is structurally normal. There is physiologic pulmonic valve regurgitation.  Pericardium: There is no pericardial effusion noted.  Aorta: The aortic root is normal.  In comparison to the previous  echocardiogram(s): Compared with study from 8/11/2022, no significant change.Relevant Results        Assessment/Plan      Mr. Mendoza is a 69 gentleman with PMHx of T2DM, HTN, HFpEF, A flutter s/p June 2024 successful ablation not on AC, possible COPD (55 pack years quit 2012) who was transferred [facility] for pericardial effusion. They did CT and ECHO imaging but none were received. Bedside ECHO on arrival confirms effusion without concern for tamponade at this time. History suggests high likelihood of pericarditis as etiology. Patient pending ECHO - will need to consider need for drainage, closely following vitals at this time.     # Pericardial Effusion likely dt pericarditis  # R/o myocarditis  Patient with recent viral illness presents with positional chest pain likely consistent with pericarditis. Bedside ECHO by Dr. Cedillo demonstrated effusion with IVC compression pattern with low concern for tamponade. Presenting EKG wnl. CXR shows possible effusions and enlarged CPS.   - Telemetry  - I&O  - q4 Vitals  - 10/15 CMP, BNP, CBC wnl  - 10/15 CRP 17  - 10/15 INR 1.2  Plan  [ ] Follow up TTE today  [ ] Trend trops 10/14 25 -> 10/15 pending  - Continue Colchicine 0.6mg BID  - Continue ASA 650mg q8   - Giving 250mL prior to TTE  - NPO in case need for drainage     #HFpEF #HTN  #Hx A Flutter s/p ablation June 2025  #s/p AVR 2020 likely dt regurg   - HOLD GDMT and antihypertensives as blow  ----Lopressor 25mg BID  ----Jardiance 25mg qd  ----Valsartan 160mg qd  ----Amlodipine 10mg qd  - HOLD daily lasix 40mg qd  - No AC or rhythm control meds at home      #Chronic issues  GERD  - Continue home protonix  HLD  - 10/15 Lipid panel: Total 123, HDL 42.3, LDL 53. Trig 136   - Continue home Crestor   - Continue home Zetia  T2DM  - 10/15 A1C 8.0  - Prev on ozempic but stopped due to cost  - Continue SSI  Neuropathy  - Continue home cymbalta, nortriptoline, pregabalin, pramipexole      F: PO  E: Replete as  "needed  N: NPO  VTE ppx: SCDs, hold lovenox in case of procedure   GI: On protonix  Access: PIV  Code status: FULL CODE confirmed on admission. Patient said verbatim \"Only if I won't be a vegetable\". Wife Karine is NOK and patient confirmed preferred decision maker .      Anastasiya Moon MD PGY1      "

## 2024-10-16 ENCOUNTER — APPOINTMENT (OUTPATIENT)
Dept: CARDIOLOGY | Facility: HOSPITAL | Age: 69
End: 2024-10-16
Payer: MEDICARE

## 2024-10-16 LAB
ALBUMIN SERPL BCP-MCNC: 2.5 G/DL (ref 3.4–5)
ALBUMIN SERPL BCP-MCNC: 3.5 G/DL (ref 3.4–5)
ALP SERPL-CCNC: 120 U/L (ref 33–136)
ALT SERPL W P-5'-P-CCNC: 19 U/L (ref 10–52)
ANION GAP SERPL CALC-SCNC: 13 MMOL/L (ref 10–20)
ANION GAP SERPL CALC-SCNC: 14 MMOL/L (ref 10–20)
AST SERPL W P-5'-P-CCNC: 18 U/L (ref 9–39)
BASOPHILS # BLD AUTO: 0.01 X10*3/UL (ref 0–0.1)
BASOPHILS NFR BLD AUTO: 0.1 %
BILIRUB SERPL-MCNC: 0.9 MG/DL (ref 0–1.2)
BUN SERPL-MCNC: 17 MG/DL (ref 6–23)
BUN SERPL-MCNC: 21 MG/DL (ref 6–23)
CALCIUM SERPL-MCNC: 6.6 MG/DL (ref 8.6–10.6)
CALCIUM SERPL-MCNC: 8.8 MG/DL (ref 8.6–10.6)
CARDIAC TROPONIN I PNL SERPL HS: 9 NG/L (ref 0–53)
CHLORIDE SERPL-SCNC: 100 MMOL/L (ref 98–107)
CHLORIDE SERPL-SCNC: 110 MMOL/L (ref 98–107)
CO2 SERPL-SCNC: 18 MMOL/L (ref 21–32)
CO2 SERPL-SCNC: 28 MMOL/L (ref 21–32)
CREAT SERPL-MCNC: 0.63 MG/DL (ref 0.5–1.3)
CREAT SERPL-MCNC: 0.84 MG/DL (ref 0.5–1.3)
EGFRCR SERPLBLD CKD-EPI 2021: >90 ML/MIN/1.73M*2
EGFRCR SERPLBLD CKD-EPI 2021: >90 ML/MIN/1.73M*2
EOSINOPHIL # BLD AUTO: 0.1 X10*3/UL (ref 0–0.7)
EOSINOPHIL NFR BLD AUTO: 1.5 %
ERYTHROCYTE [DISTWIDTH] IN BLOOD BY AUTOMATED COUNT: 17.1 % (ref 11.5–14.5)
GLUCOSE BLD MANUAL STRIP-MCNC: 126 MG/DL (ref 74–99)
GLUCOSE BLD MANUAL STRIP-MCNC: 128 MG/DL (ref 74–99)
GLUCOSE BLD MANUAL STRIP-MCNC: 136 MG/DL (ref 74–99)
GLUCOSE BLD MANUAL STRIP-MCNC: 163 MG/DL (ref 74–99)
GLUCOSE SERPL-MCNC: 123 MG/DL (ref 74–99)
GLUCOSE SERPL-MCNC: 146 MG/DL (ref 74–99)
HCT VFR BLD AUTO: 37 % (ref 41–52)
HGB BLD-MCNC: 11.3 G/DL (ref 13.5–17.5)
IMM GRANULOCYTES # BLD AUTO: 0.04 X10*3/UL (ref 0–0.7)
IMM GRANULOCYTES NFR BLD AUTO: 0.6 % (ref 0–0.9)
LYMPHOCYTES # BLD AUTO: 0.83 X10*3/UL (ref 1.2–4.8)
LYMPHOCYTES NFR BLD AUTO: 12.1 %
MAGNESIUM SERPL-MCNC: 1.49 MG/DL (ref 1.6–2.4)
MAGNESIUM SERPL-MCNC: 2.44 MG/DL (ref 1.6–2.4)
MCH RBC QN AUTO: 26.8 PG (ref 26–34)
MCHC RBC AUTO-ENTMCNC: 30.5 G/DL (ref 32–36)
MCV RBC AUTO: 88 FL (ref 80–100)
MONOCYTES # BLD AUTO: 0.73 X10*3/UL (ref 0.1–1)
MONOCYTES NFR BLD AUTO: 10.7 %
NEUTROPHILS # BLD AUTO: 5.13 X10*3/UL (ref 1.2–7.7)
NEUTROPHILS NFR BLD AUTO: 75 %
NRBC BLD-RTO: 0 /100 WBCS (ref 0–0)
PHOSPHATE SERPL-MCNC: 3 MG/DL (ref 2.5–4.9)
PHOSPHATE SERPL-MCNC: 3.1 MG/DL (ref 2.5–4.9)
PLATELET # BLD AUTO: 112 X10*3/UL (ref 150–450)
POTASSIUM SERPL-SCNC: 3.2 MMOL/L (ref 3.5–5.3)
POTASSIUM SERPL-SCNC: 4.3 MMOL/L (ref 3.5–5.3)
PROT SERPL-MCNC: 4.6 G/DL (ref 6.4–8.2)
RBC # BLD AUTO: 4.21 X10*6/UL (ref 4.5–5.9)
SODIUM SERPL-SCNC: 137 MMOL/L (ref 136–145)
SODIUM SERPL-SCNC: 139 MMOL/L (ref 136–145)
WBC # BLD AUTO: 6.8 X10*3/UL (ref 4.4–11.3)

## 2024-10-16 PROCEDURE — 80069 RENAL FUNCTION PANEL: CPT | Mod: CCI

## 2024-10-16 PROCEDURE — 83735 ASSAY OF MAGNESIUM: CPT

## 2024-10-16 PROCEDURE — 2500000001 HC RX 250 WO HCPCS SELF ADMINISTERED DRUGS (ALT 637 FOR MEDICARE OP)

## 2024-10-16 PROCEDURE — 80053 COMPREHEN METABOLIC PANEL: CPT

## 2024-10-16 PROCEDURE — 2500000002 HC RX 250 W HCPCS SELF ADMINISTERED DRUGS (ALT 637 FOR MEDICARE OP, ALT 636 FOR OP/ED)

## 2024-10-16 PROCEDURE — 36415 COLL VENOUS BLD VENIPUNCTURE: CPT

## 2024-10-16 PROCEDURE — 2500000004 HC RX 250 GENERAL PHARMACY W/ HCPCS (ALT 636 FOR OP/ED)

## 2024-10-16 PROCEDURE — 82947 ASSAY GLUCOSE BLOOD QUANT: CPT

## 2024-10-16 PROCEDURE — 84484 ASSAY OF TROPONIN QUANT: CPT

## 2024-10-16 PROCEDURE — 1200000002 HC GENERAL ROOM WITH TELEMETRY DAILY

## 2024-10-16 PROCEDURE — 85025 COMPLETE CBC W/AUTO DIFF WBC: CPT

## 2024-10-16 PROCEDURE — 99233 SBSQ HOSP IP/OBS HIGH 50: CPT

## 2024-10-16 PROCEDURE — 84100 ASSAY OF PHOSPHORUS: CPT

## 2024-10-16 PROCEDURE — 93005 ELECTROCARDIOGRAM TRACING: CPT

## 2024-10-16 RX ORDER — POTASSIUM CHLORIDE 1.5 G/1.58G
40 POWDER, FOR SOLUTION ORAL ONCE
Status: DISCONTINUED | OUTPATIENT
Start: 2024-10-16 | End: 2024-10-16

## 2024-10-16 RX ORDER — MAGNESIUM SULFATE HEPTAHYDRATE 40 MG/ML
4 INJECTION, SOLUTION INTRAVENOUS ONCE
Status: COMPLETED | OUTPATIENT
Start: 2024-10-16 | End: 2024-10-16

## 2024-10-16 RX ORDER — METOPROLOL TARTRATE 1 MG/ML
5 INJECTION, SOLUTION INTRAVENOUS ONCE
Status: COMPLETED | OUTPATIENT
Start: 2024-10-16 | End: 2024-10-16

## 2024-10-16 RX ORDER — METOPROLOL TARTRATE 25 MG/1
25 TABLET, FILM COATED ORAL 2 TIMES DAILY
Status: DISCONTINUED | OUTPATIENT
Start: 2024-10-16 | End: 2024-10-18 | Stop reason: HOSPADM

## 2024-10-16 RX ORDER — POTASSIUM CHLORIDE 20 MEQ/1
40 TABLET, EXTENDED RELEASE ORAL ONCE
Status: COMPLETED | OUTPATIENT
Start: 2024-10-16 | End: 2024-10-16

## 2024-10-16 RX ORDER — VALSARTAN 80 MG/1
80 TABLET ORAL DAILY
Status: DISCONTINUED | OUTPATIENT
Start: 2024-10-16 | End: 2024-10-17

## 2024-10-16 RX ORDER — NYSTATIN 100000 [USP'U]/G
1 POWDER TOPICAL ONCE
Status: COMPLETED | OUTPATIENT
Start: 2024-10-16 | End: 2024-10-16

## 2024-10-16 RX ORDER — BUPROPION HYDROCHLORIDE 150 MG/1
150 TABLET ORAL DAILY
Status: DISCONTINUED | OUTPATIENT
Start: 2024-10-16 | End: 2024-10-18 | Stop reason: HOSPADM

## 2024-10-16 RX ORDER — FUROSEMIDE 10 MG/ML
40 INJECTION INTRAMUSCULAR; INTRAVENOUS ONCE
Status: COMPLETED | OUTPATIENT
Start: 2024-10-16 | End: 2024-10-16

## 2024-10-16 ASSESSMENT — COGNITIVE AND FUNCTIONAL STATUS - GENERAL
DAILY ACTIVITIY SCORE: 18
WALKING IN HOSPITAL ROOM: A LITTLE
CLIMB 3 TO 5 STEPS WITH RAILING: A LITTLE
MOBILITY SCORE: 18
STANDING UP FROM CHAIR USING ARMS: A LITTLE
PERSONAL GROOMING: A LITTLE
HELP NEEDED FOR BATHING: A LITTLE
DRESSING REGULAR LOWER BODY CLOTHING: A LITTLE
MOVING TO AND FROM BED TO CHAIR: A LITTLE
DAILY ACTIVITIY SCORE: 24
DRESSING REGULAR UPPER BODY CLOTHING: A LITTLE
TURNING FROM BACK TO SIDE WHILE IN FLAT BAD: A LITTLE
MOBILITY SCORE: 23
CLIMB 3 TO 5 STEPS WITH RAILING: A LITTLE
EATING MEALS: A LITTLE
MOVING FROM LYING ON BACK TO SITTING ON SIDE OF FLAT BED WITH BEDRAILS: A LITTLE
TOILETING: A LITTLE

## 2024-10-16 ASSESSMENT — PAIN SCALES - GENERAL
PAINLEVEL_OUTOF10: 0 - NO PAIN

## 2024-10-16 ASSESSMENT — PAIN - FUNCTIONAL ASSESSMENT
PAIN_FUNCTIONAL_ASSESSMENT: 0-10

## 2024-10-16 NOTE — PROGRESS NOTES
Campos Mendoza is a 69 y.o. male on day 2 of admission presenting with Pericardial effusion ([facility]).      Subjective   Last night patient had some chest pain and went into afib with RVR EKG obtained HR up to 140s. . Had increase in SOB and went from 2L to 4L. Given 250ml LR and IV 5mg metop and converted back to sinus. Potassium at 3.2 and supplemented 40 at that time, another 40 this AM.     When seen this AM patient endorses feeling the same as yesterday with no change in his breathing or chest pain, but does say last night was rough. He denies palpitations, n/v/d, abdominal or leg pain. No obvious increase in lower extremity edema noted yesterday but crackles at bases bilaterally noted. Wife on phone, discussed plan and answered questions.     Objective     Last Recorded Vitals  /72 (BP Location: Right arm, Patient Position: Sitting)   Pulse 89   Temp 36 °C (96.8 °F) (Temporal)   Resp 18   Wt 143 kg (316 lb 5.8 oz)   SpO2 91%   Intake/Output last 3 Shifts:    Intake/Output Summary (Last 24 hours) at 10/16/2024 0932  Last data filed at 10/15/2024 1314  Gross per 24 hour   Intake 56.25 ml   Output --   Net 56.25 ml       Admission Weight  Weight: 143 kg (316 lb 5.8 oz) (10/14/24 1722)    Daily Weight  10/16/24 : 143 kg (316 lb 5.8 oz)      Physical Exam  Constitutional:       General: He is not in acute distress.     Appearance: He is not ill-appearing.   Cardiovascular:      Rate and Rhythm: Normal rate and regular rhythm.      Heart sounds: No murmur heard.     Friction rub present.      Comments: Possible friction rub  Pulmonary:      Effort: Pulmonary effort is normal.      Breath sounds: Rales present. No wheezing.      Comments: On 4L up from 2L yesterday evening.   Soft crackles bilateral bases.  Abdominal:      General: Bowel sounds are normal.      Palpations: There is no mass.      Tenderness: There is no abdominal tenderness. There is no guarding or rebound.      Comments: Firm  obese abdomen.   Musculoskeletal:      Right lower leg: Edema present.      Left lower leg: Edema present.      Comments: Bilateral pretibial edema 1-2+. Chronic venous status skin changes   Skin:     Comments: Diffuse skin tags   Neurological:      General: No focal deficit present.      Mental Status: He is alert.      Comments: Spontaneous to conversation   Psychiatric:         Mood and Affect: Mood normal.         Behavior: Behavior normal.         Thought Content: Thought content normal.         Judgment: Judgment normal.         Recent Labs Last 96 hours  Results for orders placed or performed during the hospital encounter of 10/14/24 (from the past 96 hours)   Type and screen   Result Value Ref Range    ABO TYPE B     Rh TYPE NEG     ANTIBODY SCREEN NEG    CBC and Auto Differential   Result Value Ref Range    WBC 7.6 4.4 - 11.3 x10*3/uL    nRBC 0.0 0.0 - 0.0 /100 WBCs    RBC 5.28 4.50 - 5.90 x10*6/uL    Hemoglobin 14.2 13.5 - 17.5 g/dL    Hematocrit 45.8 41.0 - 52.0 %    MCV 87 80 - 100 fL    MCH 26.9 26.0 - 34.0 pg    MCHC 31.0 (L) 32.0 - 36.0 g/dL    RDW 17.4 (H) 11.5 - 14.5 %    Platelets 133 (L) 150 - 450 x10*3/uL    Neutrophils % 68.9 40.0 - 80.0 %    Immature Granulocytes %, Automated 0.3 0.0 - 0.9 %    Lymphocytes % 20.2 13.0 - 44.0 %    Monocytes % 7.8 2.0 - 10.0 %    Eosinophils % 2.5 0.0 - 6.0 %    Basophils % 0.3 0.0 - 2.0 %    Neutrophils Absolute 5.22 1.20 - 7.70 x10*3/uL    Immature Granulocytes Absolute, Automated 0.02 0.00 - 0.70 x10*3/uL    Lymphocytes Absolute 1.53 1.20 - 4.80 x10*3/uL    Monocytes Absolute 0.59 0.10 - 1.00 x10*3/uL    Eosinophils Absolute 0.19 0.00 - 0.70 x10*3/uL    Basophils Absolute 0.02 0.00 - 0.10 x10*3/uL   Lactate   Result Value Ref Range    Lactate 3.9 (H) 0.4 - 2.0 mmol/L   B-type natriuretic peptide   Result Value Ref Range     (H) 0 - 99 pg/mL   Comprehensive metabolic panel   Result Value Ref Range    Glucose 119 (H) 74 - 99 mg/dL    Sodium 140 136 - 145  mmol/L    Potassium 3.5 3.5 - 5.3 mmol/L    Chloride 99 98 - 107 mmol/L    Bicarbonate 27 21 - 32 mmol/L    Anion Gap 18 10 - 20 mmol/L    Urea Nitrogen 23 6 - 23 mg/dL    Creatinine 1.00 0.50 - 1.30 mg/dL    eGFR 82 >60 mL/min/1.73m*2    Calcium 8.6 8.6 - 10.6 mg/dL    Albumin 3.6 3.4 - 5.0 g/dL    Alkaline Phosphatase 108 33 - 136 U/L    Total Protein 6.5 6.4 - 8.2 g/dL    AST 18 9 - 39 U/L    Bilirubin, Total 1.0 0.0 - 1.2 mg/dL    ALT 21 10 - 52 U/L   Magnesium   Result Value Ref Range    Magnesium 2.13 1.60 - 2.40 mg/dL   Phosphorus   Result Value Ref Range    Phosphorus 4.4 2.5 - 4.9 mg/dL   Troponin I, High Sensitivity   Result Value Ref Range    Troponin I, High Sensitivity (CMC) 25 0 - 53 ng/L   C-reactive protein   Result Value Ref Range    C-Reactive Protein 17.08 (H) <1.00 mg/dL   ECG 12 Lead   Result Value Ref Range    Ventricular Rate 87 BPM    Atrial Rate 87 BPM    OH Interval 214 ms    QRS Duration 98 ms    QT Interval 374 ms    QTC Calculation(Bazett) 450 ms    P Axis 51 degrees    R Axis 74 degrees    T Axis 95 degrees    QRS Count 14 beats    Q Onset 214 ms    P Onset 107 ms    P Offset 175 ms    T Offset 401 ms    QTC Fredericia 423 ms   POCT GLUCOSE   Result Value Ref Range    POCT Glucose 141 (H) 74 - 99 mg/dL   Coagulation Screen   Result Value Ref Range    Protime 13.6 (H) 9.8 - 12.8 seconds    INR 1.2 (H) 0.9 - 1.1    aPTT 27 27 - 38 seconds   Lactate   Result Value Ref Range    Lactate 2.3 (H) 0.4 - 2.0 mmol/L   POCT GLUCOSE   Result Value Ref Range    POCT Glucose 229 (H) 74 - 99 mg/dL   KRYSTEN with Reflex to NICKI   Result Value Ref Range    KRYSTEN Negative Negative   CBC   Result Value Ref Range    WBC 7.0 4.4 - 11.3 x10*3/uL    nRBC 0.0 0.0 - 0.0 /100 WBCs    RBC 4.94 4.50 - 5.90 x10*6/uL    Hemoglobin 13.5 13.5 - 17.5 g/dL    Hematocrit 44.1 41.0 - 52.0 %    MCV 89 80 - 100 fL    MCH 27.3 26.0 - 34.0 pg    MCHC 30.6 (L) 32.0 - 36.0 g/dL    RDW 17.3 (H) 11.5 - 14.5 %    Platelets 133 (L) 150 -  450 x10*3/uL   Renal Function Panel   Result Value Ref Range    Glucose 144 (H) 74 - 99 mg/dL    Sodium 137 136 - 145 mmol/L    Potassium 4.2 3.5 - 5.3 mmol/L    Chloride 104 98 - 107 mmol/L    Bicarbonate 22 21 - 32 mmol/L    Anion Gap 15 10 - 20 mmol/L    Urea Nitrogen 22 6 - 23 mg/dL    Creatinine 1.07 0.50 - 1.30 mg/dL    eGFR 76 >60 mL/min/1.73m*2    Calcium 8.2 (L) 8.6 - 10.6 mg/dL    Phosphorus 4.3 2.5 - 4.9 mg/dL    Albumin 3.6 3.4 - 5.0 g/dL   Magnesium   Result Value Ref Range    Magnesium 2.33 1.60 - 2.40 mg/dL   Hemoglobin A1c   Result Value Ref Range    Hemoglobin A1C 8.0 (H) See comment %    Estimated Average Glucose 183 Not Established mg/dL   TSH   Result Value Ref Range    Thyroid Stimulating Hormone 1.74 0.44 - 3.98 mIU/L   Lipid Panel   Result Value Ref Range    Cholesterol 123 0 - 199 mg/dL    HDL-Cholesterol 42.3 mg/dL    Cholesterol/HDL Ratio 2.9     LDL Calculated 54 <=99 mg/dL    VLDL 27 0 - 40 mg/dL    Triglycerides 136 0 - 149 mg/dL    Non HDL Cholesterol 81 0 - 149 mg/dL   Troponin I, High Sensitivity   Result Value Ref Range    Troponin I, High Sensitivity (CMC) 15 0 - 53 ng/L   POCT GLUCOSE   Result Value Ref Range    POCT Glucose 156 (H) 74 - 99 mg/dL   POCT GLUCOSE   Result Value Ref Range    POCT Glucose 138 (H) 74 - 99 mg/dL   Transthoracic Echo (TTE) Complete   Result Value Ref Range    AV pk rambo 2.52 m/s    AV mn grad 13.0 mmHg    LVOT diam 1.80 cm    MV E/A ratio 0.78     Tricuspid annular plane systolic excursion 1.1 cm    LV EF 68 %    RV free wall pk S' 10.10 cm/s    Aortic Valve Area by Continuity of VTI 1.80 cm2    Aortic Valve Area by Continuity of Peak Velocity 1.74 cm2    AV pk grad 25.4 mmHg    LV A4C EF 77.3    POCT GLUCOSE   Result Value Ref Range    POCT Glucose 103 (H) 74 - 99 mg/dL   POCT GLUCOSE   Result Value Ref Range    POCT Glucose 143 (H) 74 - 99 mg/dL   Comprehensive metabolic panel   Result Value Ref Range    Glucose 123 (H) 74 - 99 mg/dL    Sodium 139 136 -  145 mmol/L    Potassium 3.2 (L) 3.5 - 5.3 mmol/L    Chloride 110 (H) 98 - 107 mmol/L    Bicarbonate 18 (L) 21 - 32 mmol/L    Anion Gap 14 10 - 20 mmol/L    Urea Nitrogen 17 6 - 23 mg/dL    Creatinine 0.63 0.50 - 1.30 mg/dL    eGFR >90 >60 mL/min/1.73m*2    Calcium 6.6 (L) 8.6 - 10.6 mg/dL    Albumin 2.5 (L) 3.4 - 5.0 g/dL    Alkaline Phosphatase 120 33 - 136 U/L    Total Protein 4.6 (L) 6.4 - 8.2 g/dL    AST 18 9 - 39 U/L    Bilirubin, Total 0.9 0.0 - 1.2 mg/dL    ALT 19 10 - 52 U/L   CBC and Auto Differential   Result Value Ref Range    WBC 6.8 4.4 - 11.3 x10*3/uL    nRBC 0.0 0.0 - 0.0 /100 WBCs    RBC 4.21 (L) 4.50 - 5.90 x10*6/uL    Hemoglobin 11.3 (L) 13.5 - 17.5 g/dL    Hematocrit 37.0 (L) 41.0 - 52.0 %    MCV 88 80 - 100 fL    MCH 26.8 26.0 - 34.0 pg    MCHC 30.5 (L) 32.0 - 36.0 g/dL    RDW 17.1 (H) 11.5 - 14.5 %    Platelets 112 (L) 150 - 450 x10*3/uL    Neutrophils % 75.0 40.0 - 80.0 %    Immature Granulocytes %, Automated 0.6 0.0 - 0.9 %    Lymphocytes % 12.1 13.0 - 44.0 %    Monocytes % 10.7 2.0 - 10.0 %    Eosinophils % 1.5 0.0 - 6.0 %    Basophils % 0.1 0.0 - 2.0 %    Neutrophils Absolute 5.13 1.20 - 7.70 x10*3/uL    Immature Granulocytes Absolute, Automated 0.04 0.00 - 0.70 x10*3/uL    Lymphocytes Absolute 0.83 (L) 1.20 - 4.80 x10*3/uL    Monocytes Absolute 0.73 0.10 - 1.00 x10*3/uL    Eosinophils Absolute 0.10 0.00 - 0.70 x10*3/uL    Basophils Absolute 0.01 0.00 - 0.10 x10*3/uL   Phosphorus   Result Value Ref Range    Phosphorus 3.0 2.5 - 4.9 mg/dL   Magnesium   Result Value Ref Range    Magnesium 1.49 (L) 1.60 - 2.40 mg/dL   Troponin I, High Sensitivity   Result Value Ref Range    Troponin I, High Sensitivity (CMC) 9 0 - 53 ng/L   POCT GLUCOSE   Result Value Ref Range    POCT Glucose 126 (H) 74 - 99 mg/dL         Relevant Imaging     TTE 10/15   1. Poorly visualized anatomical structures due to suboptimal image quality.   2. Left ventricular ejection fraction is normal, by visual estimate at 65-70%.    3. Left ventricular diastolic filling was indeterminate.   4. There is normal right ventricular global systolic function.   5. There is moderate mitral annular calcification.   6. Moderate to large pericardial effusion. The effusion is circumferential. There is no evidence of cardiac tamponade. Pericardial effusion up to 1.1 cm anterior to the RV and 2.4 cm inferoposterior to the LV (best seen in the subcostal views). Mitral inflow variation 26%. No obvious chamber compression.   7. Compared with study dated 8/14/2023, a moderate to large pericardial effusion is now seen. The current study is significantly more limited and further comparison is challenging.    Patient reports CT Chest ad TTE at OSH, no images provided on transfer - REQUESTED    XR chest 1 view 10/14/2024  1.  Small right-sided pleural effusion and bibasilar atelectasis. 2. Enlarged cardiopericardial silhouette suggestive of pericardial effusion versus cardiomegaly.      Last TTE from OSH 08/2024 prior to onset of symptoms  PHYSICIAN INTERPRETATION:  Left Ventricle: The left ventricular systolic function is normal. There are no regional wall motion abnormalities. The left ventricular cavity size is normal. There is moderate concentric left ventricular hypertrophy. Spectral Doppler shows an impaired relaxation pattern of left ventricular diastolic filling.  Left Atrium: The left atrium is upper limits of normal in size.  Right Ventricle: The right ventricle is slightly enlarged. There is normal right ventricular global systolic function.  Right Atrium: The right atrium is normal in size.  Aortic Valve: There is a prosthetic aortic valve present. There is a [employer] porcine aortic valve bioprosthesis, with a 25 mm reported size. Echo findings are consistent with normal aortic valve prosthesis structure and function. There is no evidence of aortic valve regurgitation. The peak instantaneous gradient of the aortic valve is 26.6 mmHg. The mean gradient  of the aortic valve is 14.9 mmHg.  Mitral Valve: The mitral valve is moderately thickened. There is trace mitral valve regurgitation.  Tricuspid Valve: The tricuspid valve is structurally normal. There is mild tricuspid regurgitation.  Pulmonic Valve: The pulmonic valve is structurally normal. There is physiologic pulmonic valve regurgitation.  Pericardium: There is no pericardial effusion noted.  Aorta: The aortic root is normal.  In comparison to the previous echocardiogram(s): Compared with study from 8/11/2022, no significant change.Relevant Results    CONCLUSIONS:  1. Left ventricular systolic function is normal.  2. Spectral Doppler shows an impaired relaxation pattern of left ventricular diastolic filling.  3. There is moderate concentric left ventricular hypertrophy.  4. The mitral valve is moderately thickened.  5. There is a porcine aortic valve bioprosthesis.        Assessment/Plan      Mr. Mendoza is a 69 gentleman with PMHx of T2DM, HTN, HFpEF, A flutter s/p June 2024 successful ablation not on AC, possible COPD (55 pack years quit 2012) who was transferred from [facility] for pericardial effusion. They did CT and ECHO imaging but none were received. Bedside ECHO on arrival confirms effusion without concern for tamponade at this time. History suggests high likelihood of pericarditis as etiology. Formal ECHO without concern for tamponade, drainage deferred at this time.     # Pericardial Effusion likely dt pericarditis  # r/o myocarditis - given troponin okay not likely  # r/o malignant etiology   Patient with recent viral illness presents with positional chest pain likely consistent with pericarditis. Bedside ECHO by Dr. Cedillo demonstrated effusion with IVC compression pattern with low concern for tamponade. Presenting EKG wnl. CXR shows possible effusions and enlarged CPS. TTE 10/15 also low concern for tamponade. Cath fellow reached out to for drainage given a fib. Upon their review low  concern for tamponade and mainly posterior so difficult targets, medium effusion. Do not suggest drainage at this time, but suggest CT Chest for better visualization and need to r/o malignancy.  - q4 Vitals  - 10/15 CMP, BNP, CBC, trops wnl  - 10/15 KRYSTEN negative  - 10/15 CRP 17  - 10/15 INR 1.2  Plan  [ ] Follow up requisition for CT chest from [facility]   - Continue Colchicine 0.6mg BID  - Continue ASA 650mg q8     #HFpEF #HTN #s/p AVR 2020 likely dt regurg   Recent TTE 8/2023 with E/e' 18, daily lasix at home.   Monitoring  - Strict I&O  - Daily weights  Plan  - Continue HOLD home jardiance 25mg every day (also as below for T2DM)  - Restart home valsartan 160mg every day at half dose -> 80mg every day  - Restart lasix 40mg daily  - Continue HOLD home amlodipine 10mg every day    #Hx A Flutter s/p ablation June 2025  # Hx paroxysmal afib with RVR to 140s here  Short episode of afib here early AM 10/16 with RVR, SBP stable. Convertwd s/p 5mg IV metop  - Restart Lopressor 25mg BID  - No AC or rhythm control meds at home    #Acute Hypoxic Respiratory Failure on 4L, likely dt volume overload in c/o HFpEF with superimposed pericardial effusion  Concern for volume overload today after holding, crackles on exam, ongoing lower extremity edema, increasing O2 req. Had to hold lasix so pericardial effusion r/o tamponade. Okay to restart home lasix today for known HFpEF.  - CTM, eval after lasix  - Wean as able    #Hypokalemia to 3.2 10/16  - S/p 40x2 supplementation  - Will receive lasix today  - F/u evening RFP and mag, replete as needed    #Chronic issues  GERD  - Continue home protonix  HLD  - 10/15 Lipid panel: Total 123, HDL 42.3, LDL 53. Trig 136   - Continue home Crestor   - Continue home Zetia  T2DM  - 10/15 A1C 8.0  - HOLDING jardiance 25mg every day (as above also for HFpEF)  - Prev on ozempic but stopped due to cost, put on wellbutrin for appetite suppression patient endorses might be working  - Continue  "SSI  Neuropathy  - Continue home cymbalta, nortriptoline, pregabalin, pramipexole      F: PO  E: Replete as needed  N: NPO  VTE ppx: SCDs, hold lovenox in case of procedure   GI: On protonix  Access: PIV  Code status: FULL CODE confirmed on admission. Patient said verbatim \"Only if I won't be a vegetable\". Wife Karine is NOK and patient confirmed preferred decision maker .      Anastasiya Moon MD PGY1      "

## 2024-10-16 NOTE — NURSING NOTE
"0248:  went into patient's room for rounding. Patient c/o \"just not feeling right\", would like to be checked out. c/o feeling chest pressure going across upper chest, short of breath/having audible wheezing. /68, RR 24, SpO2 87% on 2L/nasal cannula, increased O2 to 3L, HR ranging [id number]'[id number], 140 briefly.   0300: obtained EKG.   0304: MD paged  0320: MD repaged  0327: Spoke with MD, will come top the unit to see patient  0345: MD at bedside, new orders received  "

## 2024-10-17 LAB
ALBUMIN SERPL BCP-MCNC: 3.5 G/DL (ref 3.4–5)
ALBUMIN SERPL BCP-MCNC: 3.6 G/DL (ref 3.4–5)
ALP SERPL-CCNC: 198 U/L (ref 33–136)
ALT SERPL W P-5'-P-CCNC: 48 U/L (ref 10–52)
ANION GAP SERPL CALC-SCNC: 12 MMOL/L (ref 10–20)
ANION GAP SERPL CALC-SCNC: 15 MMOL/L (ref 10–20)
AST SERPL W P-5'-P-CCNC: 55 U/L (ref 9–39)
BASOPHILS # BLD AUTO: 0.02 X10*3/UL (ref 0–0.1)
BASOPHILS NFR BLD AUTO: 0.4 %
BILIRUB SERPL-MCNC: 0.7 MG/DL (ref 0–1.2)
BUN SERPL-MCNC: 19 MG/DL (ref 6–23)
BUN SERPL-MCNC: 20 MG/DL (ref 6–23)
CALCIUM SERPL-MCNC: 8.6 MG/DL (ref 8.6–10.6)
CALCIUM SERPL-MCNC: 8.9 MG/DL (ref 8.6–10.6)
CHLORIDE SERPL-SCNC: 103 MMOL/L (ref 98–107)
CHLORIDE SERPL-SCNC: 98 MMOL/L (ref 98–107)
CO2 SERPL-SCNC: 23 MMOL/L (ref 21–32)
CO2 SERPL-SCNC: 31 MMOL/L (ref 21–32)
CREAT SERPL-MCNC: 0.76 MG/DL (ref 0.5–1.3)
CREAT SERPL-MCNC: 1.03 MG/DL (ref 0.5–1.3)
EGFRCR SERPLBLD CKD-EPI 2021: 80 ML/MIN/1.73M*2
EGFRCR SERPLBLD CKD-EPI 2021: >90 ML/MIN/1.73M*2
EOSINOPHIL # BLD AUTO: 0.2 X10*3/UL (ref 0–0.7)
EOSINOPHIL NFR BLD AUTO: 3.8 %
ERYTHROCYTE [DISTWIDTH] IN BLOOD BY AUTOMATED COUNT: 17.2 % (ref 11.5–14.5)
GLUCOSE BLD MANUAL STRIP-MCNC: 118 MG/DL (ref 74–99)
GLUCOSE BLD MANUAL STRIP-MCNC: 121 MG/DL (ref 74–99)
GLUCOSE BLD MANUAL STRIP-MCNC: 164 MG/DL (ref 74–99)
GLUCOSE BLD MANUAL STRIP-MCNC: 169 MG/DL (ref 74–99)
GLUCOSE SERPL-MCNC: 121 MG/DL (ref 74–99)
GLUCOSE SERPL-MCNC: 149 MG/DL (ref 74–99)
HCT VFR BLD AUTO: 42.6 % (ref 41–52)
HGB BLD-MCNC: 13.3 G/DL (ref 13.5–17.5)
IMM GRANULOCYTES # BLD AUTO: 0.02 X10*3/UL (ref 0–0.7)
IMM GRANULOCYTES NFR BLD AUTO: 0.4 % (ref 0–0.9)
LYMPHOCYTES # BLD AUTO: 1.22 X10*3/UL (ref 1.2–4.8)
LYMPHOCYTES NFR BLD AUTO: 23.3 %
MAGNESIUM SERPL-MCNC: 2.4 MG/DL (ref 1.6–2.4)
MCH RBC QN AUTO: 27.1 PG (ref 26–34)
MCHC RBC AUTO-ENTMCNC: 31.2 G/DL (ref 32–36)
MCV RBC AUTO: 87 FL (ref 80–100)
MONOCYTES # BLD AUTO: 0.52 X10*3/UL (ref 0.1–1)
MONOCYTES NFR BLD AUTO: 9.9 %
NEUTROPHILS # BLD AUTO: 3.26 X10*3/UL (ref 1.2–7.7)
NEUTROPHILS NFR BLD AUTO: 62.2 %
NRBC BLD-RTO: 0 /100 WBCS (ref 0–0)
PHOSPHATE SERPL-MCNC: 3.2 MG/DL (ref 2.5–4.9)
PHOSPHATE SERPL-MCNC: 3.2 MG/DL (ref 2.5–4.9)
PLATELET # BLD AUTO: 151 X10*3/UL (ref 150–450)
POTASSIUM SERPL-SCNC: 3.7 MMOL/L (ref 3.5–5.3)
POTASSIUM SERPL-SCNC: 4.2 MMOL/L (ref 3.5–5.3)
PROT SERPL-MCNC: 6.7 G/DL (ref 6.4–8.2)
RBC # BLD AUTO: 4.9 X10*6/UL (ref 4.5–5.9)
SODIUM SERPL-SCNC: 137 MMOL/L (ref 136–145)
SODIUM SERPL-SCNC: 137 MMOL/L (ref 136–145)
WBC # BLD AUTO: 5.2 X10*3/UL (ref 4.4–11.3)

## 2024-10-17 PROCEDURE — 82947 ASSAY GLUCOSE BLOOD QUANT: CPT

## 2024-10-17 PROCEDURE — 2500000001 HC RX 250 WO HCPCS SELF ADMINISTERED DRUGS (ALT 637 FOR MEDICARE OP)

## 2024-10-17 PROCEDURE — 2500000002 HC RX 250 W HCPCS SELF ADMINISTERED DRUGS (ALT 637 FOR MEDICARE OP, ALT 636 FOR OP/ED)

## 2024-10-17 PROCEDURE — 84100 ASSAY OF PHOSPHORUS: CPT

## 2024-10-17 PROCEDURE — 99233 SBSQ HOSP IP/OBS HIGH 50: CPT

## 2024-10-17 PROCEDURE — 85025 COMPLETE CBC W/AUTO DIFF WBC: CPT

## 2024-10-17 PROCEDURE — 2500000004 HC RX 250 GENERAL PHARMACY W/ HCPCS (ALT 636 FOR OP/ED)

## 2024-10-17 PROCEDURE — 36415 COLL VENOUS BLD VENIPUNCTURE: CPT

## 2024-10-17 PROCEDURE — 1200000002 HC GENERAL ROOM WITH TELEMETRY DAILY

## 2024-10-17 PROCEDURE — 83735 ASSAY OF MAGNESIUM: CPT

## 2024-10-17 PROCEDURE — 80069 RENAL FUNCTION PANEL: CPT | Mod: CCI

## 2024-10-17 PROCEDURE — 82565 ASSAY OF CREATININE: CPT

## 2024-10-17 RX ORDER — POTASSIUM CHLORIDE 20 MEQ/1
40 TABLET, EXTENDED RELEASE ORAL ONCE
Status: COMPLETED | OUTPATIENT
Start: 2024-10-17 | End: 2024-10-17

## 2024-10-17 RX ORDER — VALSARTAN 80 MG/1
80 TABLET ORAL 2 TIMES DAILY
Status: DISCONTINUED | OUTPATIENT
Start: 2024-10-17 | End: 2024-10-18 | Stop reason: HOSPADM

## 2024-10-17 RX ORDER — FUROSEMIDE 10 MG/ML
80 INJECTION INTRAMUSCULAR; INTRAVENOUS ONCE
Status: COMPLETED | OUTPATIENT
Start: 2024-10-17 | End: 2024-10-17

## 2024-10-17 RX ORDER — OXYMETAZOLINE HCL 0.05 %
2 SPRAY, NON-AEROSOL (ML) NASAL EVERY 12 HOURS PRN
Status: DISCONTINUED | OUTPATIENT
Start: 2024-10-17 | End: 2024-10-18 | Stop reason: HOSPADM

## 2024-10-17 ASSESSMENT — COGNITIVE AND FUNCTIONAL STATUS - GENERAL
DAILY ACTIVITIY SCORE: 24
MOBILITY SCORE: 24
DAILY ACTIVITIY SCORE: 24
MOBILITY SCORE: 24

## 2024-10-17 ASSESSMENT — ACTIVITIES OF DAILY LIVING (ADL): LACK_OF_TRANSPORTATION: NO

## 2024-10-17 ASSESSMENT — PAIN SCALES - GENERAL
PAINLEVEL_OUTOF10: 0 - NO PAIN
PAINLEVEL_OUTOF10: 0 - NO PAIN

## 2024-10-17 NOTE — PROGRESS NOTES
10/17/24 1233   Discharge Planning   Support Systems Spouse/significant other   Assistance Needed none   Type of Residence Private residence   Number of Stairs to Enter Residence 5   Number of Stairs Within Residence 12   Do you have animals or pets at home? No   Type of Animals or Pets cats   Who is requesting discharge planning? Provider   Home or Post Acute Services None   Expected Discharge Disposition Home   Does the patient need discharge transport arranged? No   Financial Resource Strain   How hard is it for you to pay for the very basics like food, housing, medical care, and heating? Not very   Housing Stability   In the last 12 months, was there a time when you were not able to pay the mortgage or rent on time? N   In the past 12 months, how many times have you moved where you were living? 1   At any time in the past 12 months, were you homeless or living in a shelter (including now)? N   Transportation Needs   In the past 12 months, has lack of transportation kept you from medical appointments or from getting medications? no   In the past 12 months, has lack of transportation kept you from meetings, work, or from getting things needed for daily living? No   Patient Choice   Provider Choice list and CMS website (https://medicare.gov/care-compare#search) for post-acute Quality and Resource Measure Data were provided and reviewed with: Patient   Patient / Family choosing to utilize agency / facility established prior to hospitalization No     Transitional Care Coordination Progress Note:  Patient discussed during interdisciplinary rounds.   Team members present: RN MD   Plan per Medical/Surgical team: Diuresis,CT chest w contrast to assess for pericardial effusion   Payor: [employer]   Discharge disposition: Home   Potential Barriers: None   ADOD: 10-      Previous Home Care: None   DME: None   Falls: None   PCP:  WILD NEVES  Dialysis: None

## 2024-10-17 NOTE — PROGRESS NOTES
Campos Mendoza is a 69 y.o. male on day 3 of admission presenting with Pericardial effusion ([facility]).      Subjective   Patient seen and evaluated at bedside. No acute events overnight. Patient reports that his breathing has improved, however he still feels SOB whenever he walks around. Hypokalemic yesterday, with K replete and repeat PM RFP at goal range.  Was also given lasix 40 mg IV yesterday with net output of 600 ml of urine.     Objective     Last Recorded Vitals  /76   Pulse 80   Temp 36.1 °C (97 °F)   Resp 20   Wt 143 kg (315 lb 7.7 oz)   SpO2 94%   Intake/Output last 3 Shifts:    Intake/Output Summary (Last 24 hours) at 10/17/2024 0842  Last data filed at 10/17/2024 0420  Gross per 24 hour   Intake 600 ml   Output 1200 ml   Net -600 ml       Admission Weight  Weight: 143 kg (316 lb 5.8 oz) (10/14/24 1722)    Daily Weight  10/17/24 : 143 kg (315 lb 7.7 oz)      Physical Exam  Constitutional:       General: He is not in acute distress.     Appearance: He is not ill-appearing.   Cardiovascular:      Rate and Rhythm: Normal rate and regular rhythm.      Heart sounds: No murmur heard.     Friction rub present.      Comments: Possible friction rub  Pulmonary:      Effort: Pulmonary effort is normal.      Breath sounds: Rales present. No wheezing.      Comments: On 3L NC  Soft crackles bilateral bases.  Abdominal:      General: Bowel sounds are normal.      Palpations: There is no mass.      Tenderness: There is no abdominal tenderness. There is no guarding or rebound.      Comments: Firm obese abdomen.   Musculoskeletal:      Right lower leg: Edema present.      Left lower leg: Edema present.      Comments: Bilateral pretibial edema 1-2+. Chronic venous status skin changes   Skin:     Comments: Diffuse skin tags   Neurological:      General: No focal deficit present.      Mental Status: He is alert.      Comments: Spontaneous to conversation   Psychiatric:         Mood and Affect: Mood normal.          Behavior: Behavior normal.         Thought Content: Thought content normal.         Judgment: Judgment normal.         Recent Labs Last 96 hours  Results for orders placed or performed during the hospital encounter of 10/14/24 (from the past 96 hours)   Type and screen   Result Value Ref Range    ABO TYPE B     Rh TYPE NEG     ANTIBODY SCREEN NEG    CBC and Auto Differential   Result Value Ref Range    WBC 7.6 4.4 - 11.3 x10*3/uL    nRBC 0.0 0.0 - 0.0 /100 WBCs    RBC 5.28 4.50 - 5.90 x10*6/uL    Hemoglobin 14.2 13.5 - 17.5 g/dL    Hematocrit 45.8 41.0 - 52.0 %    MCV 87 80 - 100 fL    MCH 26.9 26.0 - 34.0 pg    MCHC 31.0 (L) 32.0 - 36.0 g/dL    RDW 17.4 (H) 11.5 - 14.5 %    Platelets 133 (L) 150 - 450 x10*3/uL    Neutrophils % 68.9 40.0 - 80.0 %    Immature Granulocytes %, Automated 0.3 0.0 - 0.9 %    Lymphocytes % 20.2 13.0 - 44.0 %    Monocytes % 7.8 2.0 - 10.0 %    Eosinophils % 2.5 0.0 - 6.0 %    Basophils % 0.3 0.0 - 2.0 %    Neutrophils Absolute 5.22 1.20 - 7.70 x10*3/uL    Immature Granulocytes Absolute, Automated 0.02 0.00 - 0.70 x10*3/uL    Lymphocytes Absolute 1.53 1.20 - 4.80 x10*3/uL    Monocytes Absolute 0.59 0.10 - 1.00 x10*3/uL    Eosinophils Absolute 0.19 0.00 - 0.70 x10*3/uL    Basophils Absolute 0.02 0.00 - 0.10 x10*3/uL   Lactate   Result Value Ref Range    Lactate 3.9 (H) 0.4 - 2.0 mmol/L   B-type natriuretic peptide   Result Value Ref Range     (H) 0 - 99 pg/mL   Comprehensive metabolic panel   Result Value Ref Range    Glucose 119 (H) 74 - 99 mg/dL    Sodium 140 136 - 145 mmol/L    Potassium 3.5 3.5 - 5.3 mmol/L    Chloride 99 98 - 107 mmol/L    Bicarbonate 27 21 - 32 mmol/L    Anion Gap 18 10 - 20 mmol/L    Urea Nitrogen 23 6 - 23 mg/dL    Creatinine 1.00 0.50 - 1.30 mg/dL    eGFR 82 >60 mL/min/1.73m*2    Calcium 8.6 8.6 - 10.6 mg/dL    Albumin 3.6 3.4 - 5.0 g/dL    Alkaline Phosphatase 108 33 - 136 U/L    Total Protein 6.5 6.4 - 8.2 g/dL    AST 18 9 - 39 U/L    Bilirubin, Total  1.0 0.0 - 1.2 mg/dL    ALT 21 10 - 52 U/L   Magnesium   Result Value Ref Range    Magnesium 2.13 1.60 - 2.40 mg/dL   Phosphorus   Result Value Ref Range    Phosphorus 4.4 2.5 - 4.9 mg/dL   Troponin I, High Sensitivity   Result Value Ref Range    Troponin I, High Sensitivity (CMC) 25 0 - 53 ng/L   C-reactive protein   Result Value Ref Range    C-Reactive Protein 17.08 (H) <1.00 mg/dL   ECG 12 Lead   Result Value Ref Range    Ventricular Rate 87 BPM    Atrial Rate 87 BPM    MD Interval 214 ms    QRS Duration 98 ms    QT Interval 374 ms    QTC Calculation(Bazett) 450 ms    P Axis 51 degrees    R Axis 74 degrees    T Axis 95 degrees    QRS Count 14 beats    Q Onset 214 ms    P Onset 107 ms    P Offset 175 ms    T Offset 401 ms    QTC Fredericia 423 ms   POCT GLUCOSE   Result Value Ref Range    POCT Glucose 141 (H) 74 - 99 mg/dL   Coagulation Screen   Result Value Ref Range    Protime 13.6 (H) 9.8 - 12.8 seconds    INR 1.2 (H) 0.9 - 1.1    aPTT 27 27 - 38 seconds   Lactate   Result Value Ref Range    Lactate 2.3 (H) 0.4 - 2.0 mmol/L   POCT GLUCOSE   Result Value Ref Range    POCT Glucose 229 (H) 74 - 99 mg/dL   KRYSTEN with Reflex to NICKI   Result Value Ref Range    KRYSTEN Negative Negative   CBC   Result Value Ref Range    WBC 7.0 4.4 - 11.3 x10*3/uL    nRBC 0.0 0.0 - 0.0 /100 WBCs    RBC 4.94 4.50 - 5.90 x10*6/uL    Hemoglobin 13.5 13.5 - 17.5 g/dL    Hematocrit 44.1 41.0 - 52.0 %    MCV 89 80 - 100 fL    MCH 27.3 26.0 - 34.0 pg    MCHC 30.6 (L) 32.0 - 36.0 g/dL    RDW 17.3 (H) 11.5 - 14.5 %    Platelets 133 (L) 150 - 450 x10*3/uL   Renal Function Panel   Result Value Ref Range    Glucose 144 (H) 74 - 99 mg/dL    Sodium 137 136 - 145 mmol/L    Potassium 4.2 3.5 - 5.3 mmol/L    Chloride 104 98 - 107 mmol/L    Bicarbonate 22 21 - 32 mmol/L    Anion Gap 15 10 - 20 mmol/L    Urea Nitrogen 22 6 - 23 mg/dL    Creatinine 1.07 0.50 - 1.30 mg/dL    eGFR 76 >60 mL/min/1.73m*2    Calcium 8.2 (L) 8.6 - 10.6 mg/dL    Phosphorus 4.3 2.5 -  4.9 mg/dL    Albumin 3.6 3.4 - 5.0 g/dL   Magnesium   Result Value Ref Range    Magnesium 2.33 1.60 - 2.40 mg/dL   Hemoglobin A1c   Result Value Ref Range    Hemoglobin A1C 8.0 (H) See comment %    Estimated Average Glucose 183 Not Established mg/dL   TSH   Result Value Ref Range    Thyroid Stimulating Hormone 1.74 0.44 - 3.98 mIU/L   Lipid Panel   Result Value Ref Range    Cholesterol 123 0 - 199 mg/dL    HDL-Cholesterol 42.3 mg/dL    Cholesterol/HDL Ratio 2.9     LDL Calculated 54 <=99 mg/dL    VLDL 27 0 - 40 mg/dL    Triglycerides 136 0 - 149 mg/dL    Non HDL Cholesterol 81 0 - 149 mg/dL   Troponin I, High Sensitivity   Result Value Ref Range    Troponin I, High Sensitivity (CMC) 15 0 - 53 ng/L   POCT GLUCOSE   Result Value Ref Range    POCT Glucose 156 (H) 74 - 99 mg/dL   POCT GLUCOSE   Result Value Ref Range    POCT Glucose 138 (H) 74 - 99 mg/dL   Transthoracic Echo (TTE) Complete   Result Value Ref Range    AV pk rambo 2.52 m/s    AV mn grad 13.0 mmHg    LVOT diam 1.80 cm    MV E/A ratio 0.78     Tricuspid annular plane systolic excursion 1.1 cm    LV EF 68 %    RV free wall pk S' 10.10 cm/s    Aortic Valve Area by Continuity of VTI 1.80 cm2    Aortic Valve Area by Continuity of Peak Velocity 1.74 cm2    AV pk grad 25.4 mmHg    LV A4C EF 77.3    POCT GLUCOSE   Result Value Ref Range    POCT Glucose 103 (H) 74 - 99 mg/dL   POCT GLUCOSE   Result Value Ref Range    POCT Glucose 143 (H) 74 - 99 mg/dL   Comprehensive metabolic panel   Result Value Ref Range    Glucose 123 (H) 74 - 99 mg/dL    Sodium 139 136 - 145 mmol/L    Potassium 3.2 (L) 3.5 - 5.3 mmol/L    Chloride 110 (H) 98 - 107 mmol/L    Bicarbonate 18 (L) 21 - 32 mmol/L    Anion Gap 14 10 - 20 mmol/L    Urea Nitrogen 17 6 - 23 mg/dL    Creatinine 0.63 0.50 - 1.30 mg/dL    eGFR >90 >60 mL/min/1.73m*2    Calcium 6.6 (L) 8.6 - 10.6 mg/dL    Albumin 2.5 (L) 3.4 - 5.0 g/dL    Alkaline Phosphatase 120 33 - 136 U/L    Total Protein 4.6 (L) 6.4 - 8.2 g/dL    AST 18  9 - 39 U/L    Bilirubin, Total 0.9 0.0 - 1.2 mg/dL    ALT 19 10 - 52 U/L   CBC and Auto Differential   Result Value Ref Range    WBC 6.8 4.4 - 11.3 x10*3/uL    nRBC 0.0 0.0 - 0.0 /100 WBCs    RBC 4.21 (L) 4.50 - 5.90 x10*6/uL    Hemoglobin 11.3 (L) 13.5 - 17.5 g/dL    Hematocrit 37.0 (L) 41.0 - 52.0 %    MCV 88 80 - 100 fL    MCH 26.8 26.0 - 34.0 pg    MCHC 30.5 (L) 32.0 - 36.0 g/dL    RDW 17.1 (H) 11.5 - 14.5 %    Platelets 112 (L) 150 - 450 x10*3/uL    Neutrophils % 75.0 40.0 - 80.0 %    Immature Granulocytes %, Automated 0.6 0.0 - 0.9 %    Lymphocytes % 12.1 13.0 - 44.0 %    Monocytes % 10.7 2.0 - 10.0 %    Eosinophils % 1.5 0.0 - 6.0 %    Basophils % 0.1 0.0 - 2.0 %    Neutrophils Absolute 5.13 1.20 - 7.70 x10*3/uL    Immature Granulocytes Absolute, Automated 0.04 0.00 - 0.70 x10*3/uL    Lymphocytes Absolute 0.83 (L) 1.20 - 4.80 x10*3/uL    Monocytes Absolute 0.73 0.10 - 1.00 x10*3/uL    Eosinophils Absolute 0.10 0.00 - 0.70 x10*3/uL    Basophils Absolute 0.01 0.00 - 0.10 x10*3/uL   Phosphorus   Result Value Ref Range    Phosphorus 3.0 2.5 - 4.9 mg/dL   Magnesium   Result Value Ref Range    Magnesium 1.49 (L) 1.60 - 2.40 mg/dL   Troponin I, High Sensitivity   Result Value Ref Range    Troponin I, High Sensitivity (CMC) 9 0 - 53 ng/L   POCT GLUCOSE   Result Value Ref Range    POCT Glucose 126 (H) 74 - 99 mg/dL   POCT GLUCOSE   Result Value Ref Range    POCT Glucose 136 (H) 74 - 99 mg/dL   POCT GLUCOSE   Result Value Ref Range    POCT Glucose 163 (H) 74 - 99 mg/dL   Renal function panel   Result Value Ref Range    Glucose 146 (H) 74 - 99 mg/dL    Sodium 137 136 - 145 mmol/L    Potassium 4.3 3.5 - 5.3 mmol/L    Chloride 100 98 - 107 mmol/L    Bicarbonate 28 21 - 32 mmol/L    Anion Gap 13 10 - 20 mmol/L    Urea Nitrogen 21 6 - 23 mg/dL    Creatinine 0.84 0.50 - 1.30 mg/dL    eGFR >90 >60 mL/min/1.73m*2    Calcium 8.8 8.6 - 10.6 mg/dL    Phosphorus 3.1 2.5 - 4.9 mg/dL    Albumin 3.5 3.4 - 5.0 g/dL   Magnesium    Result Value Ref Range    Magnesium 2.44 (H) 1.60 - 2.40 mg/dL   POCT GLUCOSE   Result Value Ref Range    POCT Glucose 128 (H) 74 - 99 mg/dL   POCT GLUCOSE   Result Value Ref Range    POCT Glucose 121 (H) 74 - 99 mg/dL   CBC and Auto Differential   Result Value Ref Range    WBC 5.2 4.4 - 11.3 x10*3/uL    nRBC 0.0 0.0 - 0.0 /100 WBCs    RBC 4.90 4.50 - 5.90 x10*6/uL    Hemoglobin 13.3 (L) 13.5 - 17.5 g/dL    Hematocrit 42.6 41.0 - 52.0 %    MCV 87 80 - 100 fL    MCH 27.1 26.0 - 34.0 pg    MCHC 31.2 (L) 32.0 - 36.0 g/dL    RDW 17.2 (H) 11.5 - 14.5 %    Platelets 151 150 - 450 x10*3/uL    Neutrophils % 62.2 40.0 - 80.0 %    Immature Granulocytes %, Automated 0.4 0.0 - 0.9 %    Lymphocytes % 23.3 13.0 - 44.0 %    Monocytes % 9.9 2.0 - 10.0 %    Eosinophils % 3.8 0.0 - 6.0 %    Basophils % 0.4 0.0 - 2.0 %    Neutrophils Absolute 3.26 1.20 - 7.70 x10*3/uL    Immature Granulocytes Absolute, Automated 0.02 0.00 - 0.70 x10*3/uL    Lymphocytes Absolute 1.22 1.20 - 4.80 x10*3/uL    Monocytes Absolute 0.52 0.10 - 1.00 x10*3/uL    Eosinophils Absolute 0.20 0.00 - 0.70 x10*3/uL    Basophils Absolute 0.02 0.00 - 0.10 x10*3/uL         Relevant Imaging     TTE 10/15   1. Poorly visualized anatomical structures due to suboptimal image quality.   2. Left ventricular ejection fraction is normal, by visual estimate at 65-70%.   3. Left ventricular diastolic filling was indeterminate.   4. There is normal right ventricular global systolic function.   5. There is moderate mitral annular calcification.   6. Moderate to large pericardial effusion. The effusion is circumferential. There is no evidence of cardiac tamponade. Pericardial effusion up to 1.1 cm anterior to the RV and 2.4 cm inferoposterior to the LV (best seen in the subcostal views). Mitral inflow variation 26%. No obvious chamber compression.   7. Compared with study dated 8/14/2023, a moderate to large pericardial effusion is now seen. The current study is significantly more  limited and further comparison is challenging.    Patient reports CT Chest ad TTE at OSH, no images provided on transfer - REQUESTED    XR chest 1 view 10/14/2024  1.  Small right-sided pleural effusion and bibasilar atelectasis. 2. Enlarged cardiopericardial silhouette suggestive of pericardial effusion versus cardiomegaly.      Last TTE from OSH 08/2024 prior to onset of symptoms  PHYSICIAN INTERPRETATION:  Left Ventricle: The left ventricular systolic function is normal. There are no regional wall motion abnormalities. The left ventricular cavity size is normal. There is moderate concentric left ventricular hypertrophy. Spectral Doppler shows an impaired relaxation pattern of left ventricular diastolic filling.  Left Atrium: The left atrium is upper limits of normal in size.  Right Ventricle: The right ventricle is slightly enlarged. There is normal right ventricular global systolic function.  Right Atrium: The right atrium is normal in size.  Aortic Valve: There is a prosthetic aortic valve present. There is a [employer] porcine aortic valve bioprosthesis, with a 25 mm reported size. Echo findings are consistent with normal aortic valve prosthesis structure and function. There is no evidence of aortic valve regurgitation. The peak instantaneous gradient of the aortic valve is 26.6 mmHg. The mean gradient of the aortic valve is 14.9 mmHg.  Mitral Valve: The mitral valve is moderately thickened. There is trace mitral valve regurgitation.  Tricuspid Valve: The tricuspid valve is structurally normal. There is mild tricuspid regurgitation.  Pulmonic Valve: The pulmonic valve is structurally normal. There is physiologic pulmonic valve regurgitation.  Pericardium: There is no pericardial effusion noted.  Aorta: The aortic root is normal.  In comparison to the previous echocardiogram(s): Compared with study from 8/11/2022, no significant change.Relevant Results    CONCLUSIONS:  1. Left ventricular systolic function is  normal.  2. Spectral Doppler shows an impaired relaxation pattern of left ventricular diastolic filling.  3. There is moderate concentric left ventricular hypertrophy.  4. The mitral valve is moderately thickened.  5. There is a porcine aortic valve bioprosthesis.        Assessment/Plan      Mr. Mendoza is a 69 gentleman with PMHx of T2DM, HTN, HFpEF, A flutter s/p June 2024 successful ablation not on AC, possible COPD (55 pack years quit 2012) who was transferred [facility] for pericardial effusion. They did CT and ECHO imaging but none were received. Bedside ECHO on arrival confirms effusion without concern for tamponade at this time. History suggests high likelihood of pericarditis as etiology. Formal ECHO without concern for tamponade, drainage deferred at this time.    Updates 10/17/24  - Resume home dose of valsartan   - 80 mg IV lasix today, follow up PM RFP   - CT chest w contrast to assess for pericardial effusion   - Consider ECHO 10/18 to assess for effusion      # Pericardial Effusion likely dt pericarditis  # r/o myocarditis - given troponin okay not likely  # r/o malignant etiology   Patient with recent viral illness presents with positional chest pain likely consistent with pericarditis. Bedside ECHO by Dr. Cedillo demonstrated effusion with IVC compression pattern with low concern for tamponade. Presenting EKG wnl. CXR shows possible effusions and enlarged CPS. TTE 10/15 also low concern for tamponade. Cath fellow reached out to for drainage given a fib. Upon their review low concern for tamponade and mainly posterior so difficult targets, medium effusion. Do not suggest drainage at this time, but suggest CT Chest for better visualization and need to r/o malignancy.  - q4 Vitals  - 10/15 CMP, BNP, CBC, trops wnl  - 10/15 KRYSTEN negative  - 10/15 CRP 17  - 10/15 INR 1.2  Plan  - CT chest w contrast to assess for pericardial effusion   - Consider ECHO 10/18 to assess for effusion   - Continue  "Colchicine 0.6mg BID  - Continue ASA 650mg q8     #HFpEF #HTN #s/p AVR 2020 likely dt regurg   Recent TTE 8/2023 with E/e' 18, daily lasix at home.   Monitoring  - Strict I&O  - Daily weights  Plan  - Continue HOLD home jardiance 25mg every day (also as below for T2DM)  - Restart home valsartan 160mg    - 80 mg IV lasix today   - Continue HOLD home amlodipine 10mg every day    #Hx A Flutter s/p ablation June 2025  # Hx paroxysmal afib with RVR to 140s here  Short episode of afib here early AM 10/16 with RVR, SBP stable. Convertwd s/p 5mg IV metop  - Restart Lopressor 25mg BID  - No AC or rhythm control meds at home    #Acute Hypoxic Respiratory Failure on 4L, likely dt volume overload in c/o HFpEF with superimposed pericardial effusion  Concern for volume overload today after holding, crackles on exam, ongoing lower extremity edema, increasing O2 req. Had to hold lasix so pericardial effusion r/o tamponade. Okay to restart home lasix today for known HFpEF.  - CTM, eval after lasix  - Wean as able    #Hypokalemia to 3.2 10/16  - S/p 40x2 supplementation  - Will receive lasix today  - F/u evening RFP and mag, replete as needed    #Chronic issues  GERD  - Continue home protonix  HLD  - 10/15 Lipid panel: Total 123, HDL 42.3, LDL 53. Trig 136   - Continue home Crestor   - Continue home Zetia  T2DM  - 10/15 A1C 8.0  - HOLDING jardiance 25mg every day (as above also for HFpEF)  - Prev on ozempic but stopped due to cost, put on wellbutrin for appetite suppression patient endorses might be working  - Continue SSI  Neuropathy  - Continue home cymbalta, nortriptoline, pregabalin, pramipexole      F: PO  E: Replete as needed  N: Cardiac diet   VTE ppx: SCDs, hold lovenox in case of procedure   GI: On protonix  Access: PIV  Code status: FULL CODE confirmed on admission. Patient said verbatim \"Only if I won't be a vegetable\". Wife Karine is NOK and patient confirmed preferred decision maker .      Tushar Pedraza, DO " PGY1

## 2024-10-17 NOTE — DOCUMENTATION CLARIFICATION NOTE
"    PATIENT:               WENDY WOLFF  ACCT #:                  [id number]  MRN:                       [id number]  :                       1955  ADMIT DATE:       10/14/2024 5:08 PM  DISCH DATE:  RESPONDING PROVIDER #:        [id number]          PROVIDER RESPONSE TEXT:    Acute on Chronic Diastolic Congestive Heart Failure    CDI QUERY TEXT:    Clarification        Instruction:    Based on your assessment of the patient and the clinical information, please provide the requested documentation by clicking on the appropriate radio button and enter any additional information if prompted.    Question: Please further clarify the acuity of congestive heart failure    When answering this query, please exercise your independent professional judgment. The fact that a question is being asked, does not imply that any particular answer is desired or expected.    The patient's clinical indicators include:  Clinical Information: Pt is a 69yp male who presented with pericardial effusion.    Clinical Indicators: per PN-Cardiology 10/17: \"Acute Hypoxic Respiratory Failure on 4L, likely dt volume overload in c/o HFpEF with superimposed pericardial effusion  Concern for volume overload today after holding, crackles on exam, ongoing lower extremity edema, increasing O2 req. Had to hold lasix so pericardial effusion r/o tamponade. Okay to restart home lasix today for known HFpEF.\"    Treatment: strict IO, daily weight, Lasix 40mg IVP 10/16, Lasix 80mg IVP 10/17, supplemental oxygen    Risk Factors: pericardial effusion, HFpEF, pAfib RVR  Options provided:  -- Acute on Chronic Diastolic Congestive Heart Failure  -- Chronic Diastolic Congestive Heart Failure  -- Other - I will add my own diagnosis  -- Refer to Clinical Documentation Reviewer    Query created by: Hollie Hilton on 10/17/2024 12:50 PM      Electronically signed by:  GRIS HARGROVE DO 10/17/2024 1:30 PM          "

## 2024-10-18 ENCOUNTER — PHARMACY VISIT (OUTPATIENT)
Dept: PHARMACY | Facility: CLINIC | Age: 69
End: 2024-10-18
Payer: COMMERCIAL

## 2024-10-18 ENCOUNTER — APPOINTMENT (OUTPATIENT)
Dept: RADIOLOGY | Facility: HOSPITAL | Age: 69
End: 2024-10-18
Payer: MEDICARE

## 2024-10-18 VITALS
SYSTOLIC BLOOD PRESSURE: 150 MMHG | RESPIRATION RATE: 21 BRPM | HEIGHT: 72 IN | DIASTOLIC BLOOD PRESSURE: 84 MMHG | WEIGHT: 312.17 LBS | OXYGEN SATURATION: 94 % | BODY MASS INDEX: 42.28 KG/M2 | TEMPERATURE: 95.5 F | HEART RATE: 84 BPM

## 2024-10-18 LAB
ALBUMIN SERPL BCP-MCNC: 3.4 G/DL (ref 3.4–5)
ALP SERPL-CCNC: 170 U/L (ref 33–136)
ALT SERPL W P-5'-P-CCNC: 39 U/L (ref 10–52)
ANION GAP SERPL CALC-SCNC: 13 MMOL/L (ref 10–20)
AST SERPL W P-5'-P-CCNC: 27 U/L (ref 9–39)
ATRIAL RATE: 87 BPM
BASOPHILS # BLD AUTO: 0.02 X10*3/UL (ref 0–0.1)
BASOPHILS NFR BLD AUTO: 0.4 %
BILIRUB SERPL-MCNC: 0.6 MG/DL (ref 0–1.2)
BUN SERPL-MCNC: 19 MG/DL (ref 6–23)
CALCIUM SERPL-MCNC: 8.5 MG/DL (ref 8.6–10.6)
CHLORIDE SERPL-SCNC: 101 MMOL/L (ref 98–107)
CO2 SERPL-SCNC: 27 MMOL/L (ref 21–32)
CREAT SERPL-MCNC: 0.82 MG/DL (ref 0.5–1.3)
EGFRCR SERPLBLD CKD-EPI 2021: >90 ML/MIN/1.73M*2
EOSINOPHIL # BLD AUTO: 0.21 X10*3/UL (ref 0–0.7)
EOSINOPHIL NFR BLD AUTO: 4 %
ERYTHROCYTE [DISTWIDTH] IN BLOOD BY AUTOMATED COUNT: 16.4 % (ref 11.5–14.5)
GLUCOSE BLD MANUAL STRIP-MCNC: 122 MG/DL (ref 74–99)
GLUCOSE BLD MANUAL STRIP-MCNC: 174 MG/DL (ref 74–99)
GLUCOSE SERPL-MCNC: 119 MG/DL (ref 74–99)
HCT VFR BLD AUTO: 43.9 % (ref 41–52)
HGB BLD-MCNC: 13.6 G/DL (ref 13.5–17.5)
IMM GRANULOCYTES # BLD AUTO: 0.01 X10*3/UL (ref 0–0.7)
IMM GRANULOCYTES NFR BLD AUTO: 0.2 % (ref 0–0.9)
LYMPHOCYTES # BLD AUTO: 1.33 X10*3/UL (ref 1.2–4.8)
LYMPHOCYTES NFR BLD AUTO: 25.1 %
MAGNESIUM SERPL-MCNC: 2.06 MG/DL (ref 1.6–2.4)
MCH RBC QN AUTO: 26.8 PG (ref 26–34)
MCHC RBC AUTO-ENTMCNC: 31 G/DL (ref 32–36)
MCV RBC AUTO: 86 FL (ref 80–100)
MONOCYTES # BLD AUTO: 0.52 X10*3/UL (ref 0.1–1)
MONOCYTES NFR BLD AUTO: 9.8 %
NEUTROPHILS # BLD AUTO: 3.21 X10*3/UL (ref 1.2–7.7)
NEUTROPHILS NFR BLD AUTO: 60.5 %
NRBC BLD-RTO: 0 /100 WBCS (ref 0–0)
P AXIS: 51 DEGREES
P OFFSET: 175 MS
P ONSET: 107 MS
PHOSPHATE SERPL-MCNC: 3.2 MG/DL (ref 2.5–4.9)
PLATELET # BLD AUTO: 161 X10*3/UL (ref 150–450)
POTASSIUM SERPL-SCNC: 3.9 MMOL/L (ref 3.5–5.3)
PR INTERVAL: 214 MS
PROT SERPL-MCNC: 6.6 G/DL (ref 6.4–8.2)
Q ONSET: 214 MS
QRS COUNT: 14 BEATS
QRS DURATION: 98 MS
QT INTERVAL: 374 MS
QTC CALCULATION(BAZETT): 450 MS
QTC FREDERICIA: 423 MS
R AXIS: 74 DEGREES
RBC # BLD AUTO: 5.08 X10*6/UL (ref 4.5–5.9)
SODIUM SERPL-SCNC: 137 MMOL/L (ref 136–145)
T AXIS: 95 DEGREES
T OFFSET: 401 MS
VENTRICULAR RATE: 87 BPM
WBC # BLD AUTO: 5.3 X10*3/UL (ref 4.4–11.3)

## 2024-10-18 PROCEDURE — 2500000001 HC RX 250 WO HCPCS SELF ADMINISTERED DRUGS (ALT 637 FOR MEDICARE OP)

## 2024-10-18 PROCEDURE — 2500000002 HC RX 250 W HCPCS SELF ADMINISTERED DRUGS (ALT 637 FOR MEDICARE OP, ALT 636 FOR OP/ED)

## 2024-10-18 PROCEDURE — RXMED WILLOW AMBULATORY MEDICATION CHARGE

## 2024-10-18 PROCEDURE — 99238 HOSP IP/OBS DSCHRG MGMT 30/<: CPT

## 2024-10-18 PROCEDURE — 71260 CT THORAX DX C+: CPT

## 2024-10-18 PROCEDURE — 36415 COLL VENOUS BLD VENIPUNCTURE: CPT

## 2024-10-18 PROCEDURE — 2550000001 HC RX 255 CONTRASTS

## 2024-10-18 PROCEDURE — 71260 CT THORAX DX C+: CPT | Performed by: RADIOLOGY

## 2024-10-18 PROCEDURE — 85025 COMPLETE CBC W/AUTO DIFF WBC: CPT

## 2024-10-18 PROCEDURE — 83735 ASSAY OF MAGNESIUM: CPT

## 2024-10-18 PROCEDURE — 80053 COMPREHEN METABOLIC PANEL: CPT

## 2024-10-18 PROCEDURE — 2500000005 HC RX 250 GENERAL PHARMACY W/O HCPCS

## 2024-10-18 PROCEDURE — 84100 ASSAY OF PHOSPHORUS: CPT

## 2024-10-18 PROCEDURE — 82947 ASSAY GLUCOSE BLOOD QUANT: CPT

## 2024-10-18 RX ORDER — ASPIRIN 325 MG
650 TABLET, DELAYED RELEASE (ENTERIC COATED) ORAL EVERY 8 HOURS
Qty: 84 TABLET | Refills: 0 | Status: SHIPPED | OUTPATIENT
Start: 2024-10-18 | End: 2024-11-01

## 2024-10-18 RX ORDER — ASPIRIN 325 MG
TABLET ORAL
Status: DISCONTINUED
Start: 2024-10-18 | End: 2024-10-18 | Stop reason: HOSPADM

## 2024-10-18 RX ORDER — COLCHICINE 0.6 MG/1
0.6 TABLET ORAL 2 TIMES DAILY
Qty: 180 TABLET | Refills: 0 | Status: SHIPPED | OUTPATIENT
Start: 2024-10-18 | End: 2025-01-17

## 2024-10-18 RX ORDER — ASPIRIN 650 MG
650 TABLET, DELAYED RELEASE (ENTERIC COATED) ORAL EVERY 8 HOURS
Qty: 42 TABLET | Refills: 0 | Status: SHIPPED | OUTPATIENT
Start: 2024-10-18 | End: 2024-10-18

## 2024-10-18 RX ORDER — POTASSIUM CHLORIDE 1.5 G/1.58G
20 POWDER, FOR SOLUTION ORAL ONCE
Status: COMPLETED | OUTPATIENT
Start: 2024-10-18 | End: 2024-10-18

## 2024-10-18 RX ORDER — ASPIRIN 325 MG
650 TABLET, DELAYED RELEASE (ENTERIC COATED) ORAL EVERY 8 HOURS
Qty: 84 TABLET | Refills: 0 | Status: SHIPPED | OUTPATIENT
Start: 2024-10-18 | End: 2024-10-18

## 2024-10-18 ASSESSMENT — COGNITIVE AND FUNCTIONAL STATUS - GENERAL
MOBILITY SCORE: 24
DAILY ACTIVITIY SCORE: 24

## 2024-10-18 ASSESSMENT — PAIN SCALES - GENERAL: PAINLEVEL_OUTOF10: 0 - NO PAIN

## 2024-10-18 NOTE — CARE PLAN
The patient's goals for the shift include      The clinical goals for the shift include Pt will remain HDS throughout    Over the shift, the patient did not make progress toward the following goals. Barriers to progression include . Recommendations to address these barriers include .

## 2024-10-18 NOTE — SIGNIFICANT EVENT
Rapid Response Nurse Note: RADAR alert: 7    Pager time:   Arrival time:   Event end time:   Location: [place] [id number]  [x] Triage by phone or secure messaging    Rapid response initiated by:  [] Rapid response RN [] Family [] Nursing Supervisor [] Physician   [x] RADAR auto page [] Sepsis auto-page [] RN [] RT   [] NP/PA [] Other:     Primary reason for call:   [] BAT [] New CPAP/BiPAP [] Bleeding [] Change in mental status   [] Chest pain [] Code blue [] FiO2 >/= 50% [] HR </= 40 bpm   [] HR >/= 130 bpm [] Hyperglycemia [] Hypoglycemia [x] RADAR    [] RR </= 8 bpm [] RR >/= 30 bpm [] SBP </= 90 mmHg [] SpO2 < 90%   [] Seizure [] Sepsis [] Shorness of breath  [] Staff concern: see comments     Initial VS and/or RADAR VS: T 36.4 °C; HR 69; RR 24; /74; SPO2 90%.  Providers present at bedside (if applicable):     Interventions:  [x] None [] ABG/VBG [] Assist w/ICU transfer [] BAT paged    [] Bag mask [] Blood [] Cardioversion [] Code Blue   [] Code blue for intubation [] Code status changed [] Chest x-ray [] EKG   [] IV fluid/bolus [] KUB x-ray [] Labs/cultures [] Medication   [] Nebulizer treatment [] NIPPV (CPAP/BiPAP) [] Oxygen [] Oral airway   [] Peripheral IV [] Palliative care consult [] CT/MRI [] Sepsis protocol    [] Suctioned [] Other:     Name of ICU Provider contacted (if applicable):   Outcome:  [] Coded and  [] Code blue for intubation [] Coded and transferred to ICU []  on division   [x] Remained on division (no change) [] Remained on division + additional monitoring [] Remained in ED [] Transferred to ED   [] Transferred to ICU [] Transferred to inpatient status [] Transferred for interventions (procedure) [] Transferred to ICU stepdown    [] Transferred to surgery [] Transferred to telemetry [] Sepsis protocol [] STEMI protocol   [] Stroke protocol [x] Bedside nurse instructed to page rapid response for any concerns or acute change in condition/VS     Additional Comments:      Radar auto-page received for a radar score of 7 with the above listed vital signs. Vital signs were confirmed and reviewed with primary RN. Patient is at his current baseline and RN has no concerns. There are no indications for interventions by Rapid Response at this time. RN to contact Rapid Response with any future concerns or signs of clinical decompensation.

## 2024-10-18 NOTE — SIGNIFICANT EVENT
Rapid Response Nurse Note: RADAR alert: 6    Pager time: [phone]  Arrival time: 815  Event end time: 820  Location: [place] [id number]  [] Triage by phone or secure messaging    Rapid response initiated by:  [] Rapid response RN [] Family [] Nursing Supervisor [] Physician   [x] RADAR auto page [] Sepsis auto-page [] RN [] RT   [] NP/PA [] Other:     Primary reason for call:   [] BAT [] New CPAP/BiPAP [] Bleeding [] Change in mental status   [] Chest pain [] Code blue [] FiO2 >/= 50% [] HR </= 40 bpm   [] HR >/= 130 bpm [] Hyperglycemia [] Hypoglycemia [x] RADAR    [] RR </= 8 bpm [] RR >/= 30 bpm [] SBP </= 90 mmHg [] SpO2 < 90%   [] Seizure [] Sepsis [] Shorness of breath  [] Staff concern: see comments     Initial VS and/or RADAR VS: T 36.9 °C; HR 79; RR 21; /77; SPO2 92%.  Providers present at bedside (if applicable):  Rapid Response RN, Primary RN    Interventions:  [x] None [] ABG/VBG [] Assist w/ICU transfer [] BAT paged    [] Bag mask [] Blood [] Cardioversion [] Code Blue   [] Code blue for intubation [] Code status changed [] Chest x-ray [] EKG   [] IV fluid/bolus [] KUB x-ray [] Labs/cultures [] Medication   [] Nebulizer treatment [] NIPPV (CPAP/BiPAP) [] Oxygen [] Oral airway   [] Peripheral IV [] Palliative care consult [] CT/MRI [] Sepsis protocol    [] Suctioned [] Other:     Name of ICU Provider contacted (if applicable):   Outcome:  [] Coded and  [] Code blue for intubation [] Coded and transferred to ICU []  on division   [x] Remained on division (no change) [] Remained on division + additional monitoring [] Remained in ED [] Transferred to ED   [] Transferred to ICU [] Transferred to inpatient status [] Transferred for interventions (procedure) [] Transferred to ICU stepdown    [] Transferred to surgery [] Transferred to telemetry [] Sepsis protocol [] STEMI protocol   [] Stroke protocol [x] Bedside nurse instructed to page rapid response for any concerns or acute change in condition/VS      Additional Comments:     Radar auto-page received for a radar score of 6 with the above listed vital signs.  Vital signs were confirmed and reviewed with primary RN.  Patient is at his current baseline and RN has no concerns.  There are no indications for interventions by Rapid Response at this time.  RN to contact Rapid Response with any future concerns or signs of clinical decompensation.

## 2024-10-18 NOTE — DISCHARGE INSTRUCTIONS
"Rinku Mendoza,    You were transferred to  from [facility] because you were found to have a pericardial effusion. This means there is fluid in the space between your heart and the protective sac that surrounds your heart. The biggest danger of pericardial effusion is that too much fluid surrounds the heart and makes it impossible for the heart to pump blood to your body, called cardiac tamponade. You were assessed for tamponade multiple times while here and there is no concern you at at risk of developing a tamponade at this time. You are safe to go home on your home medications, and two new medications we started you on here in the hospital - a higher dose of aspirin, and an NSAID called colchicine.     We believe you developed pericardial effusion or fluid around your heart due to inflammation of the heart and pericardium. This condition is called pericarditis. In your case your heart and pericardium was inflamed most likely due to having have COVID-19 recently. The high dose aspirin and the colchicine will help relieve the inflammation and your effusion should shrink with time. While you were here it got smaller on serial imaging. Please continue to take the aspirin 650mg three times a day for 2 weeks and then resume your regular daily baby aspirin. Please take you colchicine 0.6mg twice daily for 3 months. Your cardiologist will provide any further instructions related to these medications.    You will need to get another heart ultrasound in 3 weeks to monitor your effusion, on or around November 8th. The formal name for this imaging is called \"Transthoracic Echocardiogram\" or TTE. This order has been placed for you for within the  system. You should see your cardiologist shortly after that so they may review your imaging and make any recommendations. Please call your cardiologist to make an appointment and explain you need to be seen after you get your ultrasound/TTE and the timeline specified " above. If you would like to get your ultrasound done within your home health system please reach out to your cardiologist as soon as possible to get this arranged.     Wishing you the best in your recovery,  Your primary team

## 2024-10-18 NOTE — PROGRESS NOTES
Campos Mendoza is a 69 y.o. male on day 4 of admission presenting with Pericardial effusion ([facility]).      Subjective   No acute events overnight. Patient doing well this morning, says it is easier to breath and take a deep breath in compared to yesterday. Denies CP, COB, palpitations, abdominal pain, n/v/d, fevers or chills. S/p 80IV lasix yesterday with NN ~2245. (Much improved output from 40 previous day)    Updates 10/18  - s/p 80 mg IV lasix today with good output, Cr looking better today, likely will repeat vs de-escalate towards home reg 40mg PO lasix.  - F/u CT chest w contrast to assess for pericardial effusion. Official read pending  - Consider ECHO today to track effusion    Objective     Last Recorded Vitals  /81   Pulse 85   Temp 36.9 °C (98.4 °F) (Temporal)   Resp 19   Wt 142 kg (312 lb 2.7 oz)   SpO2 93%   Intake/Output last 3 Shifts:    Intake/Output Summary (Last 24 hours) at 10/18/2024 0956  Last data filed at 10/18/2024 0445  Gross per 24 hour   Intake 1420 ml   Output 3905 ml   Net -2485 ml       Admission Weight  Weight: 143 kg (316 lb 5.8 oz) (10/14/24 1722)    Daily Weight  10/18/24 : 142 kg (312 lb 2.7 oz)      Physical Exam  Constitutional:       General: He is not in acute distress.     Appearance: He is not ill-appearing.   Cardiovascular:      Rate and Rhythm: Normal rate and regular rhythm.      Heart sounds: No murmur heard.     Friction rub present.      Comments: Possible friction rub  Pulmonary:      Effort: Pulmonary effort is normal.      Breath sounds: No wheezing or rales.      Comments: On 2L NC due to hypoxia overnight. This AM RA.   CTAB  Abdominal:      General: Bowel sounds are normal.      Palpations: There is no mass.      Tenderness: There is no abdominal tenderness. There is no guarding or rebound.      Comments: Firm obese abdomen.   Musculoskeletal:      Right lower leg: Edema present.      Left lower leg: Edema present.      Comments: Bilateral pretibial  edema 1+. Chronic venous status skin changes   Skin:     Comments: Diffuse skin tags   Neurological:      General: No focal deficit present.      Mental Status: He is alert.      Comments: Spontaneous to conversation   Psychiatric:         Mood and Affect: Mood normal.         Behavior: Behavior normal.         Thought Content: Thought content normal.         Judgment: Judgment normal.         Recent Labs Last 96 hours  Results for orders placed or performed during the hospital encounter of 10/14/24 (from the past 96 hours)   Type and screen   Result Value Ref Range    ABO TYPE B     Rh TYPE NEG     ANTIBODY SCREEN NEG    CBC and Auto Differential   Result Value Ref Range    WBC 7.6 4.4 - 11.3 x10*3/uL    nRBC 0.0 0.0 - 0.0 /100 WBCs    RBC 5.28 4.50 - 5.90 x10*6/uL    Hemoglobin 14.2 13.5 - 17.5 g/dL    Hematocrit 45.8 41.0 - 52.0 %    MCV 87 80 - 100 fL    MCH 26.9 26.0 - 34.0 pg    MCHC 31.0 (L) 32.0 - 36.0 g/dL    RDW 17.4 (H) 11.5 - 14.5 %    Platelets 133 (L) 150 - 450 x10*3/uL    Neutrophils % 68.9 40.0 - 80.0 %    Immature Granulocytes %, Automated 0.3 0.0 - 0.9 %    Lymphocytes % 20.2 13.0 - 44.0 %    Monocytes % 7.8 2.0 - 10.0 %    Eosinophils % 2.5 0.0 - 6.0 %    Basophils % 0.3 0.0 - 2.0 %    Neutrophils Absolute 5.22 1.20 - 7.70 x10*3/uL    Immature Granulocytes Absolute, Automated 0.02 0.00 - 0.70 x10*3/uL    Lymphocytes Absolute 1.53 1.20 - 4.80 x10*3/uL    Monocytes Absolute 0.59 0.10 - 1.00 x10*3/uL    Eosinophils Absolute 0.19 0.00 - 0.70 x10*3/uL    Basophils Absolute 0.02 0.00 - 0.10 x10*3/uL   Lactate   Result Value Ref Range    Lactate 3.9 (H) 0.4 - 2.0 mmol/L   B-type natriuretic peptide   Result Value Ref Range     (H) 0 - 99 pg/mL   Comprehensive metabolic panel   Result Value Ref Range    Glucose 119 (H) 74 - 99 mg/dL    Sodium 140 136 - 145 mmol/L    Potassium 3.5 3.5 - 5.3 mmol/L    Chloride 99 98 - 107 mmol/L    Bicarbonate 27 21 - 32 mmol/L    Anion Gap 18 10 - 20 mmol/L    Urea  Nitrogen 23 6 - 23 mg/dL    Creatinine 1.00 0.50 - 1.30 mg/dL    eGFR 82 >60 mL/min/1.73m*2    Calcium 8.6 8.6 - 10.6 mg/dL    Albumin 3.6 3.4 - 5.0 g/dL    Alkaline Phosphatase 108 33 - 136 U/L    Total Protein 6.5 6.4 - 8.2 g/dL    AST 18 9 - 39 U/L    Bilirubin, Total 1.0 0.0 - 1.2 mg/dL    ALT 21 10 - 52 U/L   Magnesium   Result Value Ref Range    Magnesium 2.13 1.60 - 2.40 mg/dL   Phosphorus   Result Value Ref Range    Phosphorus 4.4 2.5 - 4.9 mg/dL   Troponin I, High Sensitivity   Result Value Ref Range    Troponin I, High Sensitivity (CMC) 25 0 - 53 ng/L   C-reactive protein   Result Value Ref Range    C-Reactive Protein 17.08 (H) <1.00 mg/dL   ECG 12 Lead   Result Value Ref Range    Ventricular Rate 87 BPM    Atrial Rate 87 BPM    CO Interval 214 ms    QRS Duration 98 ms    QT Interval 374 ms    QTC Calculation(Bazett) 450 ms    P Axis 51 degrees    R Axis 74 degrees    T Axis 95 degrees    QRS Count 14 beats    Q Onset 214 ms    P Onset 107 ms    P Offset 175 ms    T Offset 401 ms    QTC Fredericia 423 ms   POCT GLUCOSE   Result Value Ref Range    POCT Glucose 141 (H) 74 - 99 mg/dL   Coagulation Screen   Result Value Ref Range    Protime 13.6 (H) 9.8 - 12.8 seconds    INR 1.2 (H) 0.9 - 1.1    aPTT 27 27 - 38 seconds   Lactate   Result Value Ref Range    Lactate 2.3 (H) 0.4 - 2.0 mmol/L   POCT GLUCOSE   Result Value Ref Range    POCT Glucose 229 (H) 74 - 99 mg/dL   KRYSTEN with Reflex to NICKI   Result Value Ref Range    KRYSTEN Negative Negative   CBC   Result Value Ref Range    WBC 7.0 4.4 - 11.3 x10*3/uL    nRBC 0.0 0.0 - 0.0 /100 WBCs    RBC 4.94 4.50 - 5.90 x10*6/uL    Hemoglobin 13.5 13.5 - 17.5 g/dL    Hematocrit 44.1 41.0 - 52.0 %    MCV 89 80 - 100 fL    MCH 27.3 26.0 - 34.0 pg    MCHC 30.6 (L) 32.0 - 36.0 g/dL    RDW 17.3 (H) 11.5 - 14.5 %    Platelets 133 (L) 150 - 450 x10*3/uL   Renal Function Panel   Result Value Ref Range    Glucose 144 (H) 74 - 99 mg/dL    Sodium 137 136 - 145 mmol/L    Potassium 4.2  3.5 - 5.3 mmol/L    Chloride 104 98 - 107 mmol/L    Bicarbonate 22 21 - 32 mmol/L    Anion Gap 15 10 - 20 mmol/L    Urea Nitrogen 22 6 - 23 mg/dL    Creatinine 1.07 0.50 - 1.30 mg/dL    eGFR 76 >60 mL/min/1.73m*2    Calcium 8.2 (L) 8.6 - 10.6 mg/dL    Phosphorus 4.3 2.5 - 4.9 mg/dL    Albumin 3.6 3.4 - 5.0 g/dL   Magnesium   Result Value Ref Range    Magnesium 2.33 1.60 - 2.40 mg/dL   Hemoglobin A1c   Result Value Ref Range    Hemoglobin A1C 8.0 (H) See comment %    Estimated Average Glucose 183 Not Established mg/dL   TSH   Result Value Ref Range    Thyroid Stimulating Hormone 1.74 0.44 - 3.98 mIU/L   Lipid Panel   Result Value Ref Range    Cholesterol 123 0 - 199 mg/dL    HDL-Cholesterol 42.3 mg/dL    Cholesterol/HDL Ratio 2.9     LDL Calculated 54 <=99 mg/dL    VLDL 27 0 - 40 mg/dL    Triglycerides 136 0 - 149 mg/dL    Non HDL Cholesterol 81 0 - 149 mg/dL   Troponin I, High Sensitivity   Result Value Ref Range    Troponin I, High Sensitivity (CMC) 15 0 - 53 ng/L   POCT GLUCOSE   Result Value Ref Range    POCT Glucose 156 (H) 74 - 99 mg/dL   POCT GLUCOSE   Result Value Ref Range    POCT Glucose 138 (H) 74 - 99 mg/dL   Transthoracic Echo (TTE) Complete   Result Value Ref Range    AV pk rambo 2.52 m/s    AV mn grad 13.0 mmHg    LVOT diam 1.80 cm    MV E/A ratio 0.78     Tricuspid annular plane systolic excursion 1.1 cm    LV EF 68 %    RV free wall pk S' 10.10 cm/s    Aortic Valve Area by Continuity of VTI 1.80 cm2    Aortic Valve Area by Continuity of Peak Velocity 1.74 cm2    AV pk grad 25.4 mmHg    LV A4C EF 77.3    POCT GLUCOSE   Result Value Ref Range    POCT Glucose 103 (H) 74 - 99 mg/dL   POCT GLUCOSE   Result Value Ref Range    POCT Glucose 143 (H) 74 - 99 mg/dL   Comprehensive metabolic panel   Result Value Ref Range    Glucose 123 (H) 74 - 99 mg/dL    Sodium 139 136 - 145 mmol/L    Potassium 3.2 (L) 3.5 - 5.3 mmol/L    Chloride 110 (H) 98 - 107 mmol/L    Bicarbonate 18 (L) 21 - 32 mmol/L    Anion Gap 14 10 -  20 mmol/L    Urea Nitrogen 17 6 - 23 mg/dL    Creatinine 0.63 0.50 - 1.30 mg/dL    eGFR >90 >60 mL/min/1.73m*2    Calcium 6.6 (L) 8.6 - 10.6 mg/dL    Albumin 2.5 (L) 3.4 - 5.0 g/dL    Alkaline Phosphatase 120 33 - 136 U/L    Total Protein 4.6 (L) 6.4 - 8.2 g/dL    AST 18 9 - 39 U/L    Bilirubin, Total 0.9 0.0 - 1.2 mg/dL    ALT 19 10 - 52 U/L   CBC and Auto Differential   Result Value Ref Range    WBC 6.8 4.4 - 11.3 x10*3/uL    nRBC 0.0 0.0 - 0.0 /100 WBCs    RBC 4.21 (L) 4.50 - 5.90 x10*6/uL    Hemoglobin 11.3 (L) 13.5 - 17.5 g/dL    Hematocrit 37.0 (L) 41.0 - 52.0 %    MCV 88 80 - 100 fL    MCH 26.8 26.0 - 34.0 pg    MCHC 30.5 (L) 32.0 - 36.0 g/dL    RDW 17.1 (H) 11.5 - 14.5 %    Platelets 112 (L) 150 - 450 x10*3/uL    Neutrophils % 75.0 40.0 - 80.0 %    Immature Granulocytes %, Automated 0.6 0.0 - 0.9 %    Lymphocytes % 12.1 13.0 - 44.0 %    Monocytes % 10.7 2.0 - 10.0 %    Eosinophils % 1.5 0.0 - 6.0 %    Basophils % 0.1 0.0 - 2.0 %    Neutrophils Absolute 5.13 1.20 - 7.70 x10*3/uL    Immature Granulocytes Absolute, Automated 0.04 0.00 - 0.70 x10*3/uL    Lymphocytes Absolute 0.83 (L) 1.20 - 4.80 x10*3/uL    Monocytes Absolute 0.73 0.10 - 1.00 x10*3/uL    Eosinophils Absolute 0.10 0.00 - 0.70 x10*3/uL    Basophils Absolute 0.01 0.00 - 0.10 x10*3/uL   Phosphorus   Result Value Ref Range    Phosphorus 3.0 2.5 - 4.9 mg/dL   Magnesium   Result Value Ref Range    Magnesium 1.49 (L) 1.60 - 2.40 mg/dL   Troponin I, High Sensitivity   Result Value Ref Range    Troponin I, High Sensitivity (CMC) 9 0 - 53 ng/L   POCT GLUCOSE   Result Value Ref Range    POCT Glucose 126 (H) 74 - 99 mg/dL   POCT GLUCOSE   Result Value Ref Range    POCT Glucose 136 (H) 74 - 99 mg/dL   POCT GLUCOSE   Result Value Ref Range    POCT Glucose 163 (H) 74 - 99 mg/dL   Renal function panel   Result Value Ref Range    Glucose 146 (H) 74 - 99 mg/dL    Sodium 137 136 - 145 mmol/L    Potassium 4.3 3.5 - 5.3 mmol/L    Chloride 100 98 - 107 mmol/L     Bicarbonate 28 21 - 32 mmol/L    Anion Gap 13 10 - 20 mmol/L    Urea Nitrogen 21 6 - 23 mg/dL    Creatinine 0.84 0.50 - 1.30 mg/dL    eGFR >90 >60 mL/min/1.73m*2    Calcium 8.8 8.6 - 10.6 mg/dL    Phosphorus 3.1 2.5 - 4.9 mg/dL    Albumin 3.5 3.4 - 5.0 g/dL   Magnesium   Result Value Ref Range    Magnesium 2.44 (H) 1.60 - 2.40 mg/dL   POCT GLUCOSE   Result Value Ref Range    POCT Glucose 128 (H) 74 - 99 mg/dL   POCT GLUCOSE   Result Value Ref Range    POCT Glucose 121 (H) 74 - 99 mg/dL   Comprehensive metabolic panel   Result Value Ref Range    Glucose 121 (H) 74 - 99 mg/dL    Sodium 137 136 - 145 mmol/L    Potassium 4.2 3.5 - 5.3 mmol/L    Chloride 103 98 - 107 mmol/L    Bicarbonate 23 21 - 32 mmol/L    Anion Gap 15 10 - 20 mmol/L    Urea Nitrogen 20 6 - 23 mg/dL    Creatinine 0.76 0.50 - 1.30 mg/dL    eGFR >90 >60 mL/min/1.73m*2    Calcium 8.6 8.6 - 10.6 mg/dL    Albumin 3.5 3.4 - 5.0 g/dL    Alkaline Phosphatase 198 (H) 33 - 136 U/L    Total Protein 6.7 6.4 - 8.2 g/dL    AST 55 (H) 9 - 39 U/L    Bilirubin, Total 0.7 0.0 - 1.2 mg/dL    ALT 48 10 - 52 U/L   CBC and Auto Differential   Result Value Ref Range    WBC 5.2 4.4 - 11.3 x10*3/uL    nRBC 0.0 0.0 - 0.0 /100 WBCs    RBC 4.90 4.50 - 5.90 x10*6/uL    Hemoglobin 13.3 (L) 13.5 - 17.5 g/dL    Hematocrit 42.6 41.0 - 52.0 %    MCV 87 80 - 100 fL    MCH 27.1 26.0 - 34.0 pg    MCHC 31.2 (L) 32.0 - 36.0 g/dL    RDW 17.2 (H) 11.5 - 14.5 %    Platelets 151 150 - 450 x10*3/uL    Neutrophils % 62.2 40.0 - 80.0 %    Immature Granulocytes %, Automated 0.4 0.0 - 0.9 %    Lymphocytes % 23.3 13.0 - 44.0 %    Monocytes % 9.9 2.0 - 10.0 %    Eosinophils % 3.8 0.0 - 6.0 %    Basophils % 0.4 0.0 - 2.0 %    Neutrophils Absolute 3.26 1.20 - 7.70 x10*3/uL    Immature Granulocytes Absolute, Automated 0.02 0.00 - 0.70 x10*3/uL    Lymphocytes Absolute 1.22 1.20 - 4.80 x10*3/uL    Monocytes Absolute 0.52 0.10 - 1.00 x10*3/uL    Eosinophils Absolute 0.20 0.00 - 0.70 x10*3/uL    Basophils  Absolute 0.02 0.00 - 0.10 x10*3/uL   Phosphorus   Result Value Ref Range    Phosphorus 3.2 2.5 - 4.9 mg/dL   Magnesium   Result Value Ref Range    Magnesium 2.40 1.60 - 2.40 mg/dL   POCT GLUCOSE   Result Value Ref Range    POCT Glucose 164 (H) 74 - 99 mg/dL   POCT GLUCOSE   Result Value Ref Range    POCT Glucose 118 (H) 74 - 99 mg/dL   Renal function panel   Result Value Ref Range    Glucose 149 (H) 74 - 99 mg/dL    Sodium 137 136 - 145 mmol/L    Potassium 3.7 3.5 - 5.3 mmol/L    Chloride 98 98 - 107 mmol/L    Bicarbonate 31 21 - 32 mmol/L    Anion Gap 12 10 - 20 mmol/L    Urea Nitrogen 19 6 - 23 mg/dL    Creatinine 1.03 0.50 - 1.30 mg/dL    eGFR 80 >60 mL/min/1.73m*2    Calcium 8.9 8.6 - 10.6 mg/dL    Phosphorus 3.2 2.5 - 4.9 mg/dL    Albumin 3.6 3.4 - 5.0 g/dL   POCT GLUCOSE   Result Value Ref Range    POCT Glucose 169 (H) 74 - 99 mg/dL   POCT GLUCOSE   Result Value Ref Range    POCT Glucose 122 (H) 74 - 99 mg/dL   Comprehensive metabolic panel   Result Value Ref Range    Glucose 119 (H) 74 - 99 mg/dL    Sodium 137 136 - 145 mmol/L    Potassium 3.9 3.5 - 5.3 mmol/L    Chloride 101 98 - 107 mmol/L    Bicarbonate 27 21 - 32 mmol/L    Anion Gap 13 10 - 20 mmol/L    Urea Nitrogen 19 6 - 23 mg/dL    Creatinine 0.82 0.50 - 1.30 mg/dL    eGFR >90 >60 mL/min/1.73m*2    Calcium 8.5 (L) 8.6 - 10.6 mg/dL    Albumin 3.4 3.4 - 5.0 g/dL    Alkaline Phosphatase 170 (H) 33 - 136 U/L    Total Protein 6.6 6.4 - 8.2 g/dL    AST 27 9 - 39 U/L    Bilirubin, Total 0.6 0.0 - 1.2 mg/dL    ALT 39 10 - 52 U/L   CBC and Auto Differential   Result Value Ref Range    WBC 5.3 4.4 - 11.3 x10*3/uL    nRBC 0.0 0.0 - 0.0 /100 WBCs    RBC 5.08 4.50 - 5.90 x10*6/uL    Hemoglobin 13.6 13.5 - 17.5 g/dL    Hematocrit 43.9 41.0 - 52.0 %    MCV 86 80 - 100 fL    MCH 26.8 26.0 - 34.0 pg    MCHC 31.0 (L) 32.0 - 36.0 g/dL    RDW 16.4 (H) 11.5 - 14.5 %    Platelets 161 150 - 450 x10*3/uL    Neutrophils % 60.5 40.0 - 80.0 %    Immature Granulocytes %,  Automated 0.2 0.0 - 0.9 %    Lymphocytes % 25.1 13.0 - 44.0 %    Monocytes % 9.8 2.0 - 10.0 %    Eosinophils % 4.0 0.0 - 6.0 %    Basophils % 0.4 0.0 - 2.0 %    Neutrophils Absolute 3.21 1.20 - 7.70 x10*3/uL    Immature Granulocytes Absolute, Automated 0.01 0.00 - 0.70 x10*3/uL    Lymphocytes Absolute 1.33 1.20 - 4.80 x10*3/uL    Monocytes Absolute 0.52 0.10 - 1.00 x10*3/uL    Eosinophils Absolute 0.21 0.00 - 0.70 x10*3/uL    Basophils Absolute 0.02 0.00 - 0.10 x10*3/uL   Phosphorus   Result Value Ref Range    Phosphorus 3.2 2.5 - 4.9 mg/dL   Magnesium   Result Value Ref Range    Magnesium 2.06 1.60 - 2.40 mg/dL         Relevant Imaging     Pending in-house CT 10/17    TTE 10/15   1. Poorly visualized anatomical structures due to suboptimal image quality.   2. Left ventricular ejection fraction is normal, by visual estimate at 65-70%.   3. Left ventricular diastolic filling was indeterminate.   4. There is normal right ventricular global systolic function.   5. There is moderate mitral annular calcification.   6. Moderate to large pericardial effusion. The effusion is circumferential. There is no evidence of cardiac tamponade. Pericardial effusion up to 1.1 cm anterior to the RV and 2.4 cm inferoposterior to the LV (best seen in the subcostal views). Mitral inflow variation 26%. No obvious chamber compression.   7. Compared with study dated 8/14/2023, a moderate to large pericardial effusion is now seen. The current study is significantly more limited and further comparison is challenging.    Patient reports CT Chest ad TTE at OSH, no images provided on transfer - REQUESTED    XR chest 1 view 10/14/2024  1.  Small right-sided pleural effusion and bibasilar atelectasis. 2. Enlarged cardiopericardial silhouette suggestive of pericardial effusion versus cardiomegaly.      Last TTE from OSH 08/2024 prior to onset of symptoms  PHYSICIAN INTERPRETATION:  Left Ventricle: The left ventricular systolic function is normal.  There are no regional wall motion abnormalities. The left ventricular cavity size is normal. There is moderate concentric left ventricular hypertrophy. Spectral Doppler shows an impaired relaxation pattern of left ventricular diastolic filling.  Left Atrium: The left atrium is upper limits of normal in size.  Right Ventricle: The right ventricle is slightly enlarged. There is normal right ventricular global systolic function.  Right Atrium: The right atrium is normal in size.  Aortic Valve: There is a prosthetic aortic valve present. There is a [employer] porcine aortic valve bioprosthesis, with a 25 mm reported size. Echo findings are consistent with normal aortic valve prosthesis structure and function. There is no evidence of aortic valve regurgitation. The peak instantaneous gradient of the aortic valve is 26.6 mmHg. The mean gradient of the aortic valve is 14.9 mmHg.  Mitral Valve: The mitral valve is moderately thickened. There is trace mitral valve regurgitation.  Tricuspid Valve: The tricuspid valve is structurally normal. There is mild tricuspid regurgitation.  Pulmonic Valve: The pulmonic valve is structurally normal. There is physiologic pulmonic valve regurgitation.  Pericardium: There is no pericardial effusion noted.  Aorta: The aortic root is normal.  In comparison to the previous echocardiogram(s): Compared with study from 8/11/2022, no significant change.Relevant Results    CONCLUSIONS:  1. Left ventricular systolic function is normal.  2. Spectral Doppler shows an impaired relaxation pattern of left ventricular diastolic filling.  3. There is moderate concentric left ventricular hypertrophy.  4. The mitral valve is moderately thickened.  5. There is a porcine aortic valve bioprosthesis.        Assessment/Plan      Mr. Mendoza is a 69 gentleman with PMHx of T2DM, HTN, HFpEF, A flutter s/p June 2024 successful ablation not on AC, possible COPD (55 pack years quit 2012) who was transferred from [facility] for pericardial effusion. They did CT and ECHO imaging but none were received. Bedside ECHO on arrival confirms effusion without concern for tamponade at this time. History suggests high likelihood of pericarditis as etiology. Formal ECHO without concern for tamponade, drainage deferred at this time.     # Pericardial Effusion likely dt pericarditis  # r/o myocarditis - given troponin okay not likely  # r/o malignant etiology   Patient with recent viral illness presents with positional chest pain likely consistent with pericarditis. Bedside ECHO by Dr. Cedillo demonstrated effusion with IVC compression pattern with low concern for tamponade. Presenting EKG wnl. CXR shows possible effusions and enlarged CPS. TTE 10/15 also low concern for tamponade. Cath fellow reached out to for drainage given a fib. Upon their review low concern for tamponade and mainly posterior so difficult targets, medium effusion. Do not suggest drainage at this time, but suggest CT Chest for better visualization and need to r/o malignancy.  - q4 Vitals  - 10/15 CMP, BNP, CBC, trops wnl  - 10/15 KRYSTEN negative  - 10/15 CRP 17  - 10/15 INR 1.2  Plan  - CT chest w contrast to assess for pericardial effusion   - Consider ECHO 10/18 to assess for effusion   - Continue Colchicine 0.6mg BID  - Continue ASA 650mg q8     #HFpEF #HTN #s/p AVR 2020 likely dt regurg   Recent TTE 8/2023 with E/e' 18, daily lasix at home.   Monitoring  - Strict I&O  - Daily weights  Plan  - Continue HOLD home jardiance 25mg every day (also as below for T2DM)  - Continue home valsartan 160mg    - Consider need for diuresis today with 80mg IV lasix vs deescalate towards his daily home reg 40mg PO  - Continue HOLD home [id number] every day    #Hx A Flutter s/p ablation June 2025  # Hx paroxysmal afib with RVR to 140s here  Short episode of afib here early AM 10/16 with RVR, SBP stable. Convertwd s/p 5mg IV metop. Since then has been in NSR.   - Continue home  "Lopressor 25mg BID  - No AC or rhythm control meds at home    #Acute Hypoxic Respiratory Failure on 4L, likely dt volume overload in c/o HFpEF with superimposed pericardial effusion  Concern for volume overload today after holding, crackles on exam, ongoing lower extremity edema, increasing O2 req. Had to hold lasix so pericardial effusion r/o tamponade. Okay to restart home lasix today for known HFpEF. Improved after good NN on 10/17 AM of 10/18, weaned to RA. Is having hypoxia at night likely need for CPAP.   - CTM  - Wean as able    #Chronic issues  GERD  - Continue home protonix  HLD  - 10/15 Lipid panel: Total 123, HDL 42.3, LDL 53. Trig 136   - Continue home Crestor   - Continue home Zetia  T2DM  - 10/15 A1C 8.0  - HOLDING jardiance 25mg every day (as above also for HFpEF)  - Prev on ozempic but stopped due to cost, put on wellbutrin for appetite suppression patient endorses might be working  - Continue SSI  Neuropathy  - Continue home cymbalta, nortriptoline, pregabalin, pramipexole      F: PO  E: Replete as needed  N: Cardiac diet   VTE ppx: SCDs, hold lovenox in case of procedure   GI: On protonix  Access: PIV  Code status: FULL CODE confirmed on admission. Patient said verbatim \"Only if I won't be a vegetable\". Wife Karine is NOK and patient confirmed preferred decision maker .      Anastasiya Moon MD PGY1      "

## 2024-10-19 NOTE — NURSING NOTE
RN reviewed AVS in depth with pt and pts wife in depth. Patient and wife verbalized understanding medication changes , expected labs, and expected ECHO. RN provided physical copies of labs and ECHO requisition. Wife is patient's transportation home. Patient left with all belongings accounted for including meds 2 bed delivery of colchicine. PIV removed

## 2024-10-20 NOTE — DISCHARGE SUMMARY
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 CT chest without concern for malignancy  ---Aspirin 650mg three times a day for 2 weeks and then resume baby asa; 0.6mg colchicine BID for 3 months.   ---Follow up TTE in 3 months with cardiology follow up. Patient follows with outside health system  - Patient should be instructed to use his CPAP as he has multiple hypoxic episodes nightly due to apnea.    Discharge Meds     Medication List      START taking these medications     colchicine 0.6 mg tablet; Take 1 tablet (0.6 mg) by mouth 2 times a day.   Please plan to take this for 3 months (until January 18th) unless   otherwise advised by your cardiologist.     CHANGE how you take these medications     aspirin 325 mg EC tablet; Take 2 tablets (650 mg) by mouth every 8 hours   for 14 days. After completing 14 day course then resume baby (81mg)   aspirin daily.; What changed: medication strength, how much to take, when   to take this, additional instructions     CONTINUE taking these medications     amLODIPine 10 mg tablet; Commonly known as: Norvasc   buPROPion  mg 24 hr tablet; Commonly known as: Wellbutrin XL   co-enzyme Q-10 30 mg capsule   DULoxetine 30 mg DR capsule; Commonly known as: Cymbalta   ezetimibe 10 mg tablet; Commonly known as: Zetia   furosemide 40 mg tablet; Commonly known as: Lasix   Jardiance 25 mg; Generic drug: empagliflozin   metoprolol tartrate 25 mg tablet; Commonly known as: Lopressor   nortriptyline 25 mg capsule; Commonly known as: Pamelor   omeprazole 20 mg DR capsule; Commonly known as: PriLOSEC   pramipexole 0.5 mg tablet; Commonly known as: Mirapex   pregabalin 150 mg capsule; Commonly known as: Lyrica   rosuvastatin 40 mg tablet; Commonly known as: Crestor   valsartan 160 mg  tablet; Commonly known as: Diovan       Test Results Pending At Discharge  Pending Labs       No current pending labs.            Hospital Course  Patient PMHx of T2DM, HTN, HFpEF, A flutter s/p June 2024 successful ablation not on AC, possible COPD (55 pack years quit 2012) was transferred from [facility] for consideration of drainage of pericardial effusion. Imaging here including serial TTE and CT chest showed moderate and improving pericardial effusion without concern for tamponade and drainage was deferred. Patient was HDS since time of transfer. Notably patient was put on oxygen at night due to apnea with known STAR and CPAP at home with mixed compliance. His home meds related to HFpEF hx had been held for a few days and he received catch up diuresis without issue, improved respiratory symptoms and improved lung exam. He did not need to be on oxygen at rest or with ambulation on DOD and restarted on his home meds/diuretic regimen. Patient will follow up with his outside health system and will need TTE in 3 weeks to monitor improvement. Sent home with ASA reg and colchicine as above in follow up items. He did have one episode of atrial fibrillation briefly his first morning after transfer here, possible due to his lopressor held for HTN concerned. Converted after 5mg IV metop and restarted on his home dose (25mg lopressor) without issue. Is not on AC at home and was no restarted here, in part due to concern for bleeding with pericarditis.     Pertinent Physical Exam At Time of Discharge  Please see physical exam from progress note 10/18 on DOD.     Outpatient Follow-Up  No future appointments.  Patient to follow with outside health system. Needs follow up TTE in 3 weeks (ordered if he chooses to get in  system) and cardiology follow up after TTE to track pericardial effusion improvement.       Anastasiya Moon MD

## 2024-10-21 LAB
ATRIAL RATE: 115 BPM
Q ONSET: 212 MS
QRS COUNT: 17 BEATS
QRS DURATION: 104 MS
QT INTERVAL: 356 MS
QTC CALCULATION(BAZETT): 472 MS
QTC FREDERICIA: 430 MS
R AXIS: 60 DEGREES
T AXIS: 92 DEGREES
T OFFSET: 390 MS
VENTRICULAR RATE: 106 BPM
